# Patient Record
Sex: FEMALE | Race: BLACK OR AFRICAN AMERICAN | Employment: UNEMPLOYED | URBAN - METROPOLITAN AREA
[De-identification: names, ages, dates, MRNs, and addresses within clinical notes are randomized per-mention and may not be internally consistent; named-entity substitution may affect disease eponyms.]

---

## 2019-12-19 ENCOUNTER — HOSPITAL ENCOUNTER (OUTPATIENT)
Dept: LAB | Age: 44
Discharge: HOME | End: 2019-12-19

## 2019-12-19 ENCOUNTER — OFFICE VISIT (OUTPATIENT)
Dept: FAMILY MEDICINE CLINIC | Age: 44
End: 2019-12-19

## 2019-12-19 VITALS
HEIGHT: 62 IN | DIASTOLIC BLOOD PRESSURE: 67 MMHG | OXYGEN SATURATION: 95 % | TEMPERATURE: 98.4 F | WEIGHT: 186.4 LBS | HEART RATE: 78 BPM | SYSTOLIC BLOOD PRESSURE: 105 MMHG | BODY MASS INDEX: 34.3 KG/M2 | RESPIRATION RATE: 16 BRPM

## 2019-12-19 DIAGNOSIS — Z80.0 FAMILY HISTORY OF COLON CANCER IN FATHER: ICD-10-CM

## 2019-12-19 DIAGNOSIS — Z00.00 HEALTHCARE MAINTENANCE: Primary | ICD-10-CM

## 2019-12-19 DIAGNOSIS — Z00.00 HEALTHCARE MAINTENANCE: ICD-10-CM

## 2019-12-19 LAB
ALBUMIN SERPL-MCNC: 4.2 G/DL (ref 3.5–5)
ALBUMIN/GLOB SERPL: 1 {RATIO} (ref 1.1–2.2)
ALP SERPL-CCNC: 82 U/L (ref 45–117)
ALT SERPL-CCNC: 26 U/L (ref 12–78)
ANION GAP SERPL CALC-SCNC: 8 MMOL/L (ref 5–15)
AST SERPL-CCNC: 18 U/L (ref 15–37)
BASOPHILS # BLD: 0.1 K/UL (ref 0–0.1)
BASOPHILS NFR BLD: 1 % (ref 0–1)
BILIRUB SERPL-MCNC: 0.5 MG/DL (ref 0.2–1)
BUN SERPL-MCNC: 20 MG/DL (ref 6–20)
BUN/CREAT SERPL: 23 (ref 12–20)
CALCIUM SERPL-MCNC: 9.2 MG/DL (ref 8.5–10.1)
CHLORIDE SERPL-SCNC: 101 MMOL/L (ref 97–108)
CHOLEST SERPL-MCNC: 272 MG/DL
CO2 SERPL-SCNC: 26 MMOL/L (ref 21–32)
CREAT SERPL-MCNC: 0.88 MG/DL (ref 0.55–1.02)
DIFFERENTIAL METHOD BLD: NORMAL
EOSINOPHIL # BLD: 0.2 K/UL (ref 0–0.4)
EOSINOPHIL NFR BLD: 2 % (ref 0–7)
ERYTHROCYTE [DISTWIDTH] IN BLOOD BY AUTOMATED COUNT: 13.5 % (ref 11.5–14.5)
GLOBULIN SER CALC-MCNC: 4.1 G/DL (ref 2–4)
GLUCOSE SERPL-MCNC: 93 MG/DL (ref 65–100)
HCT VFR BLD AUTO: 39.7 % (ref 35–47)
HDLC SERPL-MCNC: 64 MG/DL
HDLC SERPL: 4.3 {RATIO} (ref 0–5)
HGB BLD-MCNC: 12.3 G/DL (ref 11.5–16)
IMM GRANULOCYTES # BLD AUTO: 0 K/UL (ref 0–0.04)
IMM GRANULOCYTES NFR BLD AUTO: 0 % (ref 0–0.5)
LDLC SERPL CALC-MCNC: 192.4 MG/DL (ref 0–100)
LIPID PROFILE,FLP: ABNORMAL
LYMPHOCYTES # BLD: 2.2 K/UL (ref 0.8–3.5)
LYMPHOCYTES NFR BLD: 27 % (ref 12–49)
MCH RBC QN AUTO: 26.5 PG (ref 26–34)
MCHC RBC AUTO-ENTMCNC: 31 G/DL (ref 30–36.5)
MCV RBC AUTO: 85.4 FL (ref 80–99)
MONOCYTES # BLD: 0.6 K/UL (ref 0–1)
MONOCYTES NFR BLD: 7 % (ref 5–13)
NEUTS SEG # BLD: 5.1 K/UL (ref 1.8–8)
NEUTS SEG NFR BLD: 63 % (ref 32–75)
NRBC # BLD: 0 K/UL (ref 0–0.01)
NRBC BLD-RTO: 0 PER 100 WBC
PLATELET # BLD AUTO: 266 K/UL (ref 150–400)
PMV BLD AUTO: 10.2 FL (ref 8.9–12.9)
POTASSIUM SERPL-SCNC: 3.9 MMOL/L (ref 3.5–5.1)
PROT SERPL-MCNC: 8.3 G/DL (ref 6.4–8.2)
RBC # BLD AUTO: 4.65 M/UL (ref 3.8–5.2)
SODIUM SERPL-SCNC: 135 MMOL/L (ref 136–145)
TRIGL SERPL-MCNC: 78 MG/DL (ref ?–150)
TSH SERPL DL<=0.05 MIU/L-ACNC: 1.38 UIU/ML (ref 0.36–3.74)
VLDLC SERPL CALC-MCNC: 15.6 MG/DL
WBC # BLD AUTO: 8.1 K/UL (ref 3.6–11)

## 2019-12-19 RX ORDER — BISMUTH SUBSALICYLATE 262 MG
1 TABLET,CHEWABLE ORAL DAILY
COMMUNITY
End: 2021-10-01

## 2019-12-19 NOTE — PROGRESS NOTES
Chief Complaint   Patient presents with   [address] [place] [address]     1. Have you been to the ER, urgent care clinic since your last visit? Hospitalized since your last visit? no    2. Have you seen or consulted any other health care providers outside of the [address] [place] [address] since your last visit? Include any pap smears or colon screening.  no      Tdap--had in 2012  flu--October for flu--    Pap smear--last pap 2015    Abdominal discomfort---going on for about 2 years

## 2019-12-19 NOTE — PROGRESS NOTES
Liam Salazar is an 38 y.o. female who presents to [facility] care   Patient was previously receiving care at: [facility] with Dr Johan Fernandes history significant for: HLD, PCOS, prediabetes, anemia, hand OA. Patient would like to address abdominal pain at this time. First noticed pain during her physical three years ago while doctor was palpating abdomen. States abdominal pain is only present on palpation, diffuse, 1-2/10 intensity. Currently not present. Denies any diarrhea, constipation, hematochezia, melena, N/V, unintentional weight loss. Prior PCP collected an FOBT which was negative. Has never had colonoscopy. [facility] Care  PAP all normal   Mammogram negative  LMP: 19  Regular menstrual periods  Diet exercising     PAP 3 years ago \"normal\"    Review of Systems   Review of Systems   Constitutional: Negative for chills and fever. HENT: Negative for congestion. Eyes: Negative for blurred vision and double vision. Respiratory: Negative for cough and hemoptysis. Cardiovascular: Negative for chest pain and palpitations. Gastrointestinal: Negative for abdominal pain, blood in stool, constipation, diarrhea, heartburn, melena, nausea and vomiting. Genitourinary: Negative for dysuria and urgency. Musculoskeletal: Negative for back pain and joint pain. Neurological: Negative for dizziness, focal weakness and headaches. Psychiatric/Behavioral: Negative for suicidal ideas. Current Medications  Current medications include:   Current Outpatient Medications   Medication Sig    Cetirizine (ZYRTEC) 10 mg cap Take  by mouth.  multivitamin (ONE A DAY) tablet Take 1 Tab by mouth daily. No current facility-administered medications for this visit. Allergies  No Known Allergies    Past Medical History  History reviewed. No pertinent past medical history.     Past Surgical History   Past Surgical History:   Procedure Laterality Date    HX  SECTION         Family History  Family History   Problem Relation Age of Onset    Hypertension Mother     Elevated Lipids Mother     Diabetes Mother     Colon Cancer Father     Heart Disease Father     Hypertension Father     Elevated Lipids Father        Social History  Social History     Socioeconomic History    Marital status:      Spouse name: Not on file    Number of children: Not on file    Years of education: Not on file    Highest education level: Not on file   Occupational History    Not on file   Social Needs    Financial resource strain: Not on file    Food insecurity:     Worry: Not on file     Inability: Not on file    Transportation needs:     Medical: Not on file     Non-medical: Not on file   Tobacco Use    Smoking status: Never Smoker    Smokeless tobacco: Never Used   Substance and Sexual Activity    Alcohol use:  Yes    Drug use: Never    Sexual activity: Yes   Lifestyle    Physical activity:     Days per week: Not on file     Minutes per session: Not on file    Stress: Not on file   Relationships    Social connections:     Talks on phone: Not on file     Gets together: Not on file     Attends Evangelical service: Not on file     Active member of club or organization: Not on file     Attends meetings of clubs or organizations: Not on file     Relationship status: Not on file    Intimate partner violence:     Fear of current or ex partner: Not on file     Emotionally abused: Not on file     Physically abused: Not on file     Forced sexual activity: Not on file   Other Topics Concern    Not on file   Social History Narrative    Not on file       Immunizations  Immunization History   Administered Date(s) Administered    Influenza Vaccine 10/15/2019    Tdap 06/15/2012       Health Maintenance  Colonoscopy never performed, FOBT negative    Objective   Vital Signs  Visit Vitals  /67 (BP 1 Location: Right arm, BP Patient Position: Sitting)   Pulse 78   Temp 98.4 °F (36.9 °C) (Oral)   Resp 16   Ht 5' 2\" (1.575 m)   Wt 186 lb 6.4 oz (84.6 kg)   LMP 12/03/2019 (Approximate)   SpO2 95%   BMI 34.09 kg/m²       Physical Exam  Physical Exam  Constitutional:       Appearance: Normal appearance. HENT:      Head: Normocephalic and atraumatic. Neck:      Musculoskeletal: Normal range of motion and neck supple. Cardiovascular:      Rate and Rhythm: Normal rate and regular rhythm. Heart sounds: No murmur. Pulmonary:      Effort: Pulmonary effort is normal. No respiratory distress. Breath sounds: Normal breath sounds. No wheezing, rhonchi or rales. Abdominal:      General: Abdomen is flat. Bowel sounds are normal. There is no distension. Palpations: Abdomen is soft. There is no mass. Tenderness: There is tenderness. There is no guarding or rebound. Comments: Diffusely tender to palpation     Musculoskeletal: Normal range of motion. General: No swelling. Skin:     General: Skin is warm and dry. Capillary Refill: Capillary refill takes less than 2 seconds. Neurological:      General: No focal deficit present. Mental Status: She is alert and oriented to person, place, and time. Mental status is at baseline. Psychiatric:         Mood and Affect: Mood normal.           Assessment:   Paco Clark is a 38 y.o. here to establish to care. Plan:   1. Abdominal pain: Nonspecific, only present on palpation. FOBT negative. - Given father passed away from colon cancer will refer patient to GI.     2. Health Maintenance  - CBC, CMP, lipid panel, A1c, TSH, and Vit D  - Referral to GI for colon cancer screening given father passed away from colon cancer  - Due for mammogram  - Will sign consent to obtain medical records from previous PCP. · Counseled re: diet, exercise, healthy lifestyle  · Appropriate labs, vaccines, imaging studies, and referrals ordered as listed above  · Discussed the patient's BMI with her.     · The patient was counseled on the dangers of tobacco use, and was advised to quit. Reviewed strategies to maximize success, including written materials. I discussed the aforementioned diagnoses with the patient as well as the plan of care. All questions were answered and an AVS was provided.      I discussed this patient with Dr. Andrew Mehta (Attending Physician)      Signed By:  Kenneth Barros MD    Family Medicine Resident, PGY1

## 2019-12-20 LAB
25(OH)D3 SERPL-MCNC: 40.2 NG/ML (ref 30–100)
EST. AVERAGE GLUCOSE BLD GHB EST-MCNC: 114 MG/DL
HBA1C MFR BLD: 5.6 % (ref 4–5.6)

## 2019-12-21 NOTE — PROGRESS NOTES
A1c 5.4  TSH wnl  Lipid panel t chol 272, TL 78, .4 current ASCVD risk 0.3% would not benefit from statin therapy at this time.    CMP with Na mildly low 135  CBC wnl  Vit d wnl

## 2019-12-27 ENCOUNTER — TELEPHONE (OUTPATIENT)
Dept: FAMILY MEDICINE CLINIC | Age: 44
End: 2019-12-27

## 2019-12-27 NOTE — TELEPHONE ENCOUNTER
Per patient,    appt is 2/3/20 with below office, states she will see the NP, she doesn't know name    Rolan Villeda None Evaristo Rivera MD 1     Office ph [phone]

## 2020-01-10 ENCOUNTER — HOSPITAL ENCOUNTER (OUTPATIENT)
Dept: MAMMOGRAPHY | Age: 45
Discharge: HOME | End: 2020-01-10
Attending: STUDENT IN AN ORGANIZED HEALTH CARE EDUCATION/TRAINING PROGRAM
Payer: OTHER GOVERNMENT

## 2020-01-10 DIAGNOSIS — Z00.00 HEALTHCARE MAINTENANCE: ICD-10-CM

## 2020-01-10 PROCEDURE — 77067 SCR MAMMO BI INCL CAD: CPT

## 2020-11-23 ENCOUNTER — VIRTUAL VISIT (OUTPATIENT)
Dept: FAMILY MEDICINE CLINIC | Age: 45
End: 2020-11-23
Payer: OTHER GOVERNMENT

## 2020-11-23 DIAGNOSIS — R20.0 NUMBNESS OF LEFT HAND: ICD-10-CM

## 2020-11-23 DIAGNOSIS — R51.9 NONINTRACTABLE EPISODIC HEADACHE, UNSPECIFIED HEADACHE TYPE: Primary | ICD-10-CM

## 2020-11-23 DIAGNOSIS — H53.8 BLURRY VISION, RIGHT EYE: ICD-10-CM

## 2020-11-23 PROCEDURE — 99443 PR PHYS/QHP TELEPHONE EVALUATION 21-30 MIN: CPT | Performed by: STUDENT IN AN ORGANIZED HEALTH CARE EDUCATION/TRAINING PROGRAM

## 2020-11-23 NOTE — Clinical Note
Please place [facility] for sports med clinic appt for L hand numbness in the next 3-4 weeks. Thanks!

## 2020-11-23 NOTE — PROGRESS NOTES
Cephus Seip  41 y.o. female  1975  [facility] [address] [id number]  [id number]    [phone] (home)      [address]:    Telephone Encounter  Hailey Stuart, [place]       Encounter Date: 11/23/2020 at 4:22 PM    Consent: Cephus Seip, who was seen by synchronous (real-time) audio only technology, and/or her healthcare decision maker, is aware that this patient-initiated, Telehealth encounter on 11/23/2020 is a billable service, with coverage as determined by her insurance carrier. She is aware that she may receive a bill and has provided verbal consent to proceed: Yes. Chief Complaint   Patient presents with    Headache       History of Present Illness   Cephus Seip is a 41 y.o. female was evaluated by telephone. I communicated with the patient and/or health care decision maker about her HA's and weight loss. Headaches  - Present for about 10 weeks  - Described as pounding, severe HA for a few minutes. Located on whole head, but worse on R>L side. Occurs 1-2 times per week. Not increasing in frequency. HA's come about most noticeable when she rolls over in bed. Cannot recreate the HA's on her own. No TTP of forehead/temporal artery. Denies lacrimation, rhinorrhea or nasal congestion.  - Denies N, V, photophobia, phonophobia   - No hx of any blood pressure issues. 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 At recent health fair at the end of August with her job, L eye was 20/20 and R eye was 50/20    L hand numbness  - Also reporting an \"itch\" in her hand that has now transitioned to a numbness, tingling sensation in her palmar aspect of her L hand. Reports some neck pain on her L side as well as b/l wrist pain.        Of note, she denies any 1st degree family relatives with autoimmune conditions or MS    Review of Systems   Review of Systems   Constitutional: Negative for chills, fever, malaise/fatigue and weight loss. HENT: Negative for sore throat. Respiratory: Negative for cough and shortness of breath. Cardiovascular: Negative for chest pain and palpitations. Neurological: Positive for headaches. Negative for dizziness and weakness. Vitals/Objective:   General: Patient speaking in complete sentences without effort. Normal speech and cooperative. Patient-Reported Vitals 11/23/2020   Patient-Reported Weight 190   Patient-Reported Height 5'2\"   Patient-Reported Pulse 90   Patient-Reported Temperature 98.1   Patient-Reported LMP 11/3/20        Due to this being a Virtual Check-in/Telephone evaluation, many elements of the physical examination are unable to be assessed. Assessment and Plan:   Time-based coding, delete if not needed: I spent at least 25 minutes with this established patient, and >50% of the time was spent counseling and/or coordinating care regarding her headaches, vision changes, hand numbness  Total Time: minutes: 21-30 minutes    1. Nonintractable episodic headache, unspecified headache type: Potentially cluster HA, though appears to be an atypical presentation. Will hold off on imaging at this time and refer patient to neurology  - REFERRAL TO NEUROLOGY    2. Blurry vision, right eye: Vision noted to be diminished in R eye recently compared to L. Will refer for formal evaluation.   - REFERRAL TO OPHTHALMOLOGY    3. Numbness of left hand: Potentially related to neck or wrist pain. Will need evaluation in sports med clinic to further evaluate and may consider imaging if needed. - Sent message to schedule sports med appointment in clinic       We discussed the expected course, resolution and complications of the diagnosis(es) in detail. Medication risks, benefits, costs, interactions, and alternatives were discussed as indicated.   I advised her to contact the office if her condition worsens, changes or fails to improve as anticipated. She expressed understanding with the diagnosis(es) and plan. Patient understands that this encounter was a temporary measure, and the importance of further follow up and examination was emphasized. Patient verbalized understanding. Patient informed to follow up: 4 weeks    I affirm this is a Patient Initiated Episode with an Established Patient who has not had a related appointment within my department in the past 7 days or scheduled within the next 24 hours. Note: not billable if this call serves to triage the patient into an appointment for the relevant concern      Electronically Signed: Jennifer Lao DO  Providers location when delivering service: clinic    CPT:  04875 (5-10 minutes)  [phone] (11-20 minutes)  21  (21-30 minutes)    Medicare:  [address]      ICD-10-CM ICD-9-CM    1. Nonintractable episodic headache, unspecified headache type  R51.9 784.0 REFERRAL TO NEUROLOGY   2. Blurry vision, right eye  H53.8 368.8 REFERRAL TO OPHTHALMOLOGY   3. Numbness of left hand  R20.0 782.0        Pursuant to the emergency declaration under the [facility] waiver authority and the [employer] Act, this Virtual  Visit was conducted, with patient's consent, to reduce the patient's risk of exposure to COVID-19 and provide continuity of care for an established patient. History   Patients past medical, surgical and family histories were personally reviewed and updated.   Yes    Past Medical History:   Diagnosis Date    Anemia     HLD (hyperlipidemia)     PCOS (polycystic ovarian syndrome)     Prediabetes      Past Surgical History:   Procedure Laterality Date    HX  SECTION       Family History   Problem Relation Age of Onset    Hypertension Mother    Martina Finney Elevated Lipids Mother     Diabetes Mother    Martina Finney Arthritis-osteo Mother     Cancer Mother     Lung Disease Mother     Colon Cancer Father     Heart Disease Father     Hypertension Father     Elevated Lipids Father     Cancer Father     Lung Disease Father     Breast Cancer Paternal Aunt 79     Social History     Socioeconomic History    Marital status:      Spouse name: Not on file    Number of children: Not on file    Years of education: Not on file    Highest education level: Not on file   Occupational History    Not on file   Social Needs    Financial resource strain: Not on file    Food insecurity     Worry: Not on file     Inability: Not on file    Transportation needs     Medical: Not on file     Non-medical: Not on file   Tobacco Use    Smoking status: Never Smoker    Smokeless tobacco: Never Used   Substance and Sexual Activity    Alcohol use: Yes     Alcohol/week: 2.0 standard drinks     Types: 2 Standard drinks or equivalent per week    Drug use: Never    Sexual activity: Yes     Partners: Male     Birth control/protection: Surgical     Comment:  surgical, 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   Lifestyle    Physical activity     Days per week: Not on file     Minutes per session: Not on file    Stress: Not on file   Relationships    Social connections     Talks on phone: Not on file     Gets together: Not on file     Attends Evangelical service: Not on file     Active member of club or organization: Not on file     Attends meetings of clubs or organizations: Not on file     Relationship status: Not on file    Intimate partner violence     Fear of current or ex partner: Not on file     Emotionally abused: Not on file     Physically abused: Not on file     Forced sexual activity: Not on file   Other Topics Concern    Not on file   Social History Narrative    Not on file            Current Medications/Allergies   Medications and Allergies reviewed:    Current Outpatient Medications   Medication Sig Dispense Refill    Cetirizine (ZYRTEC) 10 mg cap Take  by mouth.      multivitamin (ONE A DAY) tablet Take 1 Tab by mouth daily.        No Known Allergies

## 2020-11-23 NOTE — PROGRESS NOTES
[address] Medicine Residency Attending Addendum:  Dr. Deloris Braun, DO,  the patient and I were not physically present during this encounter. The resident and I are concurrently monitoring the patient care through appropriate telecommunication technology. I discussed the findings, assessment and plan with the resident and agree with the resident's findings and plan as documented in the resident's note.       Ashley Elliott MD      Patient-Reported Vitals 11/23/2020   Patient-Reported Weight 190   Patient-Reported Height 5'2\"   Patient-Reported Pulse 90   Patient-Reported Temperature 98.1   Patient-Reported LMP 11/3/20

## 2020-11-30 ENCOUNTER — TELEPHONE (OUTPATIENT)
Dept: FAMILY MEDICINE CLINIC | Age: 45
End: 2020-11-30

## 2020-11-30 NOTE — TELEPHONE ENCOUNTER
Called, no answer.  Left message to call office  ===View-only below this line===  ----- Message -----  From: Liset Chilel DO  Sent: 11/23/2020   6:29 PM EST  To: [facility] Front Office    Please place tickler for sports med clinic appt for L hand numbness in the next 3-4 weeks

## 2020-12-18 ENCOUNTER — VIRTUAL VISIT (OUTPATIENT)
Dept: FAMILY MEDICINE CLINIC | Age: 45
End: 2020-12-18
Payer: OTHER GOVERNMENT

## 2020-12-18 DIAGNOSIS — E66.9 OBESITY (BMI 30.0-34.9): ICD-10-CM

## 2020-12-18 DIAGNOSIS — E78.2 MIXED HYPERLIPIDEMIA: ICD-10-CM

## 2020-12-18 DIAGNOSIS — Z00.00 WELL WOMAN EXAM (NO GYNECOLOGICAL EXAM): Primary | ICD-10-CM

## 2020-12-18 PROCEDURE — 99396 PREV VISIT EST AGE 40-64: CPT | Performed by: STUDENT IN AN ORGANIZED HEALTH CARE EDUCATION/TRAINING PROGRAM

## 2020-12-18 NOTE — PROGRESS NOTES
History of Present Illness:     Consent:  Patient and/or health care decision maker is aware that that she may receive a bill for this telephone service, depending on her insurance coverage, and has provided verbal consent to proceed: Yes    Patient was evaluated by synchronous (real-time) audio-video technology from home, through the [employer] portal      Chief Complaint   Patient presents with    Medication Evaluation       Bianca Louie is a 41 y.o. female with no significant PMH who is being seen today for a complete physical:     Medical Hx and Medications: Seasonal Allergies & PCOS, takes Zyrtec 10 mg   Surg Hx: CS X2  FH: Father with colon cancer ( at 79 yrs of age) & mom & 2 aunts with breast cancer. Allergies: None     Preventative measures:   Colonoscopy: NA  TDAP: UTD  Mammogram: UTD  PAP: Due    Social measures:  Smoking: None  Alcohol: Ocasionally (1-2 drinks 3X a week)  Exercise: Was doing well with exercise but then took a travel covid assignment and has not done much since. Was going to [employer] and doing online videos as well  Diet: Lately has been eating 1-2 meals a day but when she does well she does several small meals a day and avoids simple carbs. PMH (REVIEWED):  Past Medical History:   Diagnosis Date    Anemia     HLD (hyperlipidemia)     PCOS (polycystic ovarian syndrome)     Prediabetes        Current Medications/Allergies (REVIEWED):     Current Outpatient Medications on File Prior to Visit   Medication Sig Dispense Refill    Cetirizine (ZYRTEC) 10 mg cap Take  by mouth.  multivitamin (ONE A DAY) tablet Take 1 Tab by mouth daily. No current facility-administered medications on file prior to visit. No Known Allergies    Review of Systems:     Review of Systems   Constitutional: Negative for chills and fever. HENT: Negative for congestion and sore throat. Respiratory: Negative for cough and shortness of breath.     Cardiovascular: Negative for chest pain and palpitations. Gastrointestinal: Negative for diarrhea, nausea and vomiting. Psychiatric/Behavioral: Negative for depression and suicidal ideas. Objective:     General: alert, cooperative, no distress   Mental  status: mental status: alert, oriented to person, place, and time, normal mood, behavior, speech, dress, motor activity, and thought processes   Resp: resp: normal effort and no respiratory distress   Neuro: neuro: no gross deficits   Skin: skin: no discoloration or lesions of concern on visible areas   Due to this being a TeleHealth evaluation, many elements of the physical examination are unable to be assessed. Assessment and Plan:     Well woman exam (no gynecological exam)  - Discussed PAP, pt would like to wait till spring   - METABOLIC PANEL, BASIC; Future    Mixed hyperlipidemia  - LIPID PANEL; Future    Obesity (BMI 30.0-34.9)  - We discussed lifestyle and dietary changes- intermittent fasting vs keto   - Discussed Dr. Kimani Myers weight loss clinic and sent my chart message with info    Follow up: for PAP    Jade Shin DO    Time spent in direct conversation with the patient to include medical condition(s) discussed, assessment and treatment plan:    We discussed the expected course, resolution and complications of the diagnosis(es) in detail. Medication risks, benefits, costs, interactions, and alternatives were discussed as indicated. I advised her to contact the office if her condition worsens, changes or fails to improve as anticipated. She expressed understanding with the diagnosis(es) and plan. Patient understands that this encounter was a temporary measure, and the importance of further follow up and examination was emphasized. Patient verbalized understanding.        Electronically Signed: Jade Shin DO    Providers location when delivering service (clinic, hospital, home): Home     CPT Codes 30762-56853 for Established Patients may apply to this Telehealth Visit. POS code: 18.   Modifier GT

## 2020-12-20 NOTE — PROGRESS NOTES
[address] Medicine Residency Attending Addendum:  Dr. Travis Ibarra, DO,  the patient and I were not physically present during this encounter. The resident and I are concurrently monitoring the patient care through appropriate telecommunication technology. I discussed the findings, assessment and plan with the resident and agree with the resident's findings and plan as documented in the resident's note.       Beryle Marrow, MD

## 2021-01-26 ENCOUNTER — TELEPHONE (OUTPATIENT)
Dept: FAMILY MEDICINE CLINIC | Age: 46
End: 2021-01-26

## 2021-01-26 NOTE — TELEPHONE ENCOUNTER
Tried calling Camilla Shameka Melina regarding concerns and left VM. Will send message to front office to schedule appointment.

## 2021-01-26 NOTE — TELEPHONE ENCOUNTER
----- Message from Lynne Chavez sent at 1/25/2021 11:07 AM EST -----  Regarding: FW: Non-Urgent Medical Question  Contact: [phone]  This is 2/2 messages. Thank you  ----- Message -----  From: Bacilio Paz  Sent: 1/16/2021   9:58 AM EST  To: [facility] Nurse  Subject: RE: Non-Urgent Medical Question                  Dr. Kenneth Swift, I unfortunately just found a lump on my left breast/axilla area and will need a diagnostic mammogram. I have felt other weird things before but this has me unsettled by the way it feels and moves. Maybe its because my mom is battling breast cancer right now I dont know, but a I believe I need a diagnostic mammogram this time around.   Thank you

## 2021-01-27 ENCOUNTER — TELEPHONE (OUTPATIENT)
Dept: FAMILY MEDICINE CLINIC | Age: 46
End: 2021-01-27

## 2021-01-27 NOTE — TELEPHONE ENCOUNTER
----- Message from Yaquelin Hicks MD sent at 1/26/2021  5:30 PM EST -----  Regarding: appointment  Please schedule VV as soon as possible please. Thank  you!

## 2021-02-09 ENCOUNTER — TELEPHONE (OUTPATIENT)
Dept: FAMILY MEDICINE CLINIC | Age: 46
End: 2021-02-09

## 2021-02-09 DIAGNOSIS — Z12.31 BREAST CANCER SCREENING BY MAMMOGRAM: Primary | ICD-10-CM

## 2021-02-23 ENCOUNTER — TELEPHONE (OUTPATIENT)
Dept: FAMILY MEDICINE CLINIC | Age: 46
End: 2021-02-23

## 2021-02-23 ENCOUNTER — HOSPITAL ENCOUNTER (OUTPATIENT)
Dept: MAMMOGRAPHY | Age: 46
Discharge: HOME | End: 2021-02-23
Attending: STUDENT IN AN ORGANIZED HEALTH CARE EDUCATION/TRAINING PROGRAM

## 2021-02-23 DIAGNOSIS — Z12.31 BREAST CANCER SCREENING BY MAMMOGRAM: ICD-10-CM

## 2021-02-25 ENCOUNTER — TELEPHONE (OUTPATIENT)
Dept: FAMILY MEDICINE CLINIC | Age: 46
End: 2021-02-25

## 2021-02-25 DIAGNOSIS — Z12.31 BREAST CANCER SCREENING BY MAMMOGRAM: ICD-10-CM

## 2021-02-25 DIAGNOSIS — N63.0 BREAST LUMP: Primary | ICD-10-CM

## 2021-02-25 DIAGNOSIS — R92.2 DENSE BREAST TISSUE ON MAMMOGRAM: ICD-10-CM

## 2021-02-25 NOTE — TELEPHONE ENCOUNTER
Patient states her mother was recently diagnosed with breast cancer and she feels a lump, requesting mammogram order.  Will order diagnostic mammogram.

## 2021-02-26 ENCOUNTER — OFFICE VISIT (OUTPATIENT)
Dept: NEUROLOGY | Age: 46
End: 2021-02-26
Payer: OTHER GOVERNMENT

## 2021-02-26 VITALS
OXYGEN SATURATION: 98 % | SYSTOLIC BLOOD PRESSURE: 124 MMHG | DIASTOLIC BLOOD PRESSURE: 78 MMHG | HEART RATE: 89 BPM | BODY MASS INDEX: 38.23 KG/M2 | TEMPERATURE: 97.5 F | WEIGHT: 209 LBS

## 2021-02-26 DIAGNOSIS — R20.2 PARESTHESIAS IN LEFT HAND: ICD-10-CM

## 2021-02-26 DIAGNOSIS — R51.9 POUNDING HEADACHE: ICD-10-CM

## 2021-02-26 DIAGNOSIS — H53.9 VISUAL CHANGES: Primary | ICD-10-CM

## 2021-02-26 PROCEDURE — 99204 OFFICE O/P NEW MOD 45 MIN: CPT | Performed by: PSYCHIATRY & NEUROLOGY

## 2021-03-03 ENCOUNTER — HOSPITAL ENCOUNTER (OUTPATIENT)
Dept: MRI IMAGING | Age: 46
Discharge: HOME | End: 2021-03-03
Attending: PSYCHIATRY & NEUROLOGY
Payer: OTHER GOVERNMENT

## 2021-03-03 DIAGNOSIS — H53.9 VISUAL CHANGES: ICD-10-CM

## 2021-03-03 DIAGNOSIS — R51.9 POUNDING HEADACHE: ICD-10-CM

## 2021-03-03 DIAGNOSIS — R20.2 PARESTHESIAS IN LEFT HAND: ICD-10-CM

## 2021-03-03 PROCEDURE — 74011250636 HC RX REV CODE- 250/636: Performed by: PSYCHIATRY & NEUROLOGY

## 2021-03-03 PROCEDURE — 70553 MRI BRAIN STEM W/O & W/DYE: CPT

## 2021-03-03 PROCEDURE — A9575 INJ GADOTERATE MEGLUMI 0.1ML: HCPCS | Performed by: PSYCHIATRY & NEUROLOGY

## 2021-03-03 RX ORDER — GADOTERATE MEGLUMINE 376.9 MG/ML
18 INJECTION INTRAVENOUS
Status: COMPLETED | OUTPATIENT
Start: 2021-03-03 | End: 2021-03-03

## 2021-03-03 RX ADMIN — GADOTERATE MEGLUMINE 18 ML: 376.9 INJECTION INTRAVENOUS at 09:00

## 2021-03-09 ENCOUNTER — HOSPITAL ENCOUNTER (OUTPATIENT)
Dept: MAMMOGRAPHY | Age: 46
Discharge: HOME | End: 2021-03-09
Attending: STUDENT IN AN ORGANIZED HEALTH CARE EDUCATION/TRAINING PROGRAM
Payer: OTHER GOVERNMENT

## 2021-03-09 DIAGNOSIS — N63.0 LUMP OR MASS IN BREAST: ICD-10-CM

## 2021-03-09 DIAGNOSIS — Z12.31 BREAST CANCER SCREENING BY MAMMOGRAM: ICD-10-CM

## 2021-03-09 PROCEDURE — 76642 ULTRASOUND BREAST LIMITED: CPT

## 2021-03-09 PROCEDURE — 77062 BREAST TOMOSYNTHESIS BI: CPT

## 2021-04-09 ENCOUNTER — TELEPHONE (OUTPATIENT)
Dept: FAMILY MEDICINE CLINIC | Age: 46
End: 2021-04-09

## 2021-10-01 ENCOUNTER — OFFICE VISIT (OUTPATIENT)
Dept: FAMILY MEDICINE CLINIC | Age: 46
End: 2021-10-01
Payer: OTHER GOVERNMENT

## 2021-10-01 VITALS
SYSTOLIC BLOOD PRESSURE: 115 MMHG | TEMPERATURE: 99 F | OXYGEN SATURATION: 97 % | HEIGHT: 62 IN | HEART RATE: 87 BPM | DIASTOLIC BLOOD PRESSURE: 75 MMHG | BODY MASS INDEX: 38.23 KG/M2 | RESPIRATION RATE: 16 BRPM

## 2021-10-01 DIAGNOSIS — M79.662 PAIN AND SWELLING OF LEFT LOWER LEG: ICD-10-CM

## 2021-10-01 DIAGNOSIS — R06.83 SNORING: ICD-10-CM

## 2021-10-01 DIAGNOSIS — R03.0 ELEVATED BLOOD PRESSURE READING WITHOUT DIAGNOSIS OF HYPERTENSION: Primary | ICD-10-CM

## 2021-10-01 DIAGNOSIS — M79.89 PAIN AND SWELLING OF LEFT LOWER LEG: ICD-10-CM

## 2021-10-01 DIAGNOSIS — E66.9 OBESITY (BMI 30-39.9): ICD-10-CM

## 2021-10-01 DIAGNOSIS — G47.33 OBSTRUCTIVE SLEEP APNEA SYNDROME: ICD-10-CM

## 2021-10-01 PROCEDURE — 99213 OFFICE O/P EST LOW 20 MIN: CPT | Performed by: STUDENT IN AN ORGANIZED HEALTH CARE EDUCATION/TRAINING PROGRAM

## 2021-10-01 NOTE — PROGRESS NOTES
Subjective   CC:  Nichole Bhatia is a 40 y.o. female who presents for follow up after ED visit. Left leg pain/swelling:   - Started on 9/26. First noticed pain, swelling, and firmness in her left medial thigh that travelled to left medial calf. - Seen at [facility] ED on 9/29 where she had a negative D-dimer (no LLE US) and was discharged home  - Pain/swelling has improved  - No erythema, warmth, or fever   - No obvious injury or fall prior to onset     Elevated BP:  - While in the ED BP was elevated to 980 systolic, eventually improved to 737Q systolic by the time she was discharged   - No hx of elevated BP  - No headache, dizziness, CP, SOB    Sleep apnea:  - Diagnosed with KEYONNA in 2014 while living in [place], [place]  - Was prescribed a mouth guard to wear while sleeping but never got a chance to have it made   - Has gained ~20-25 lbs since then   - Snores, has apneic episodes   - No fatigue/morning headaches   - Had somnoplasty over the summer, last treatment August 2021   - Would like to be evaluated again by sleep medicine     Joint/muscle pain:   - Diagnosed with fibromyalgia several years ago by her OB/GYN  - Has diffuse joint and muscle pain that is worse with palpation   - No improvement with exercise, the only thing that made it better was cutting out gluten from her diet for a period of time   - Had been exercising regularly at [address] [place] [address] [email or web address] ([employer] style workouts) up until August when a new semester started     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 HENT: Negative for congestion, rhinorrhea and sore throat. Respiratory: Negative for cough, shortness of breath and wheezing. Cardiovascular: Positive for leg swelling. Negative for chest pain and palpitations. Gastrointestinal: Negative for abdominal pain, diarrhea, nausea and vomiting. Musculoskeletal: Positive for arthralgias and myalgias. Skin: Negative for color change, rash and wound. Neurological: Negative for dizziness, weakness, numbness and headaches. Past Medical History  Past Medical History:   Diagnosis Date    Anemia     Fibromyalgia     HLD (hyperlipidemia)     Obesity (BMI 30-39. 9)     KEYONNA (obstructive sleep apnea)     PCOS (polycystic ovarian syndrome)     Prediabetes        Current Medications  Current Outpatient Medications   Medication Sig Dispense Refill    carboxymethylcellulose/citric (PLENITY PO) Take  by mouth.  Cetirizine (ZYRTEC) 10 mg cap Take  by mouth. Allergies  No Known Allergies    Social History   Social History     Socioeconomic History    Marital status:      Spouse name: Not on file    Number of children: Not on file    Years of education: Not on file    Highest education level: Not on file   Occupational History     Comment: Nurse at [address]    Smoking status: Never Smoker    Smokeless tobacco: Never Used   Substance and Sexual Activity    Alcohol use: Yes     Alcohol/week: 2.0 standard drinks     Types: 2 Standard drinks or equivalent per week    Drug use: Never    Sexual activity: Yes     Partners: Male     Birth control/protection: Surgical     Comment:  surgical, not me. Still open for business   Other Topics Concern    Not on file   Social History Narrative    Not on file     Social Determinants of Health     Financial Resource Strain:     Difficulty of Paying Living Expenses:    Food Insecurity:     Worried About Running Out of Food in the Last Year:     [address] in the Last Year:    Transportation Needs:     Lack of Transportation (Medical):      Lack of Transportation (Non-Medical):    Physical Activity:     Days of Exercise per Week:     Minutes of Exercise per Session:    Stress:     Feeling of Stress :    Social Connections:     Frequency of Communication with Friends and Family:     Frequency of Social Gatherings with Friends and Family:     Attends [employer] Services:     Active Member of Clubs or Organizations:     Attends Club or Organization Meetings:     Marital Status:    Intimate Partner Violence:     Fear of Current or Ex-Partner:     Emotionally Abused:     Physically Abused:     Sexually Abused:        Family History  Family History   Problem Relation Age of Onset    Hypertension Mother     Elevated Lipids Mother     Diabetes Mother    Cleophas Lanes Mother     Cancer Mother     Lung Disease Mother     Breast Cancer Mother         age 76    Colon Cancer Father     Heart Disease Father     Hypertension Father     Elevated Lipids Father     Cancer Father     Lung Disease Father     Breast Cancer Paternal Aunt 79       Objective   Vital Signs  Visit Vitals  /75 (BP 1 Location: Right upper arm, BP Patient Position: Sitting)   Pulse 87   Temp 99 °F (37.2 °C) (Oral)   Resp 16   Ht 5' 2\" (1.575 m)   SpO2 97%   BMI 38.23 kg/m²       Physical Examination  Physical Exam  Vitals and nursing note reviewed. Constitutional:       General: She is not in acute distress. Appearance: She is obese. She is not toxic-appearing. HENT:      Head: Normocephalic and atraumatic. Eyes:      Conjunctiva/sclera: Conjunctivae normal.      Pupils: Pupils are equal, round, and reactive to light. Cardiovascular:      Rate and Rhythm: Normal rate and regular rhythm. Pulses: Normal pulses. Heart sounds: Normal heart sounds. No murmur heard. Pulmonary:      Effort: Pulmonary effort is normal. No respiratory distress. Breath sounds: Normal breath sounds. No wheezing, rhonchi or rales. Musculoskeletal:         General: Tenderness present. No swelling. Cervical back: Neck supple. Right lower leg: No edema. Left lower leg: No edema. Comments: BL calves measure 42 cm. Minimal TTP in left medial calf. No palpable cord. No obvious swelling, erythema, or warmth. Skin:     General: Skin is warm and dry. Findings: No erythema, lesion or rash. Neurological:      General: No focal deficit present. Mental Status: She is alert and oriented to person, place, and time. Sensory: No sensory deficit. Gait: Gait normal.       Assessment and Plan   Hi Mclean is a 40 y.o. who is here for follow up after ED visit. 1. Elevated blood pressure reading without diagnosis of hypertension - BP elevated to up to 068G systolic at [facility] ED when being seen for leg pain/swelling. wel lcontrolled here at 115/75 which is where her BP typically runs. Likely elevated in the setting of worrying/pain. - Will have her check BP 2-3 days/week at home and keep a log of these values to bring to follow up. - F/U in 2 weeks      2. Pain and swelling of left lower leg - Left medial calf and thigh pain/swelling with suspected palpable cord. Negative D-dimer in [facility] ED with no imaging performed there. Possibly superficial thrombophlebitis vs strain. Low concern for DVT based on exam at this time. Patient advised to monitor symptoms and notify me if they worsen. 3. Obstructive sleep apnea syndrome - Previously diagnosed in 2014 in [place], [place]. S/p recent somnoplasty. Sending for repeat sleep study here. - SLEEP MEDICINE REFERRAL    4. Snoring - See above. - SLEEP MEDICINE REFERRAL    5. Obesity (BMI 30-39.9) - BMI 38.23  - SLEEP MEDICINE REFERRAL      Patient is counseled to return to the office if symptoms do not improve as expected. Urgent consultation with the nearest Emergency Department is strongly recommended if condition worsens. Patient is counseled to follow up as recommended and to inform the office if any changes in treatment are recommended.       I discussed this patient with Dr. Darius Kauffman (Attending Physician)       Signed By:  Parth Tariq DO  Family Medicine Resident

## 2021-10-01 NOTE — PROGRESS NOTES
Sigrid Banks is a 40 y.o. female    Chief Complaint   Patient presents with    Leg Pain     Patient is coming in for left leg pain. Starts from knee to mid calf, at his time it is just a little sore. She went to [address] [place] [address] ED on wednesday and her Blood pressure was elevated. No other concerns. 1. Have you been to the ER, urgent care clinic since your last visit? Hospitalized since your last visit? No    2. Have you seen or consulted any other health care providers outside of the [address] [place] [address] since your last visit? Include any pap smears or colon screening. No      Visit Vitals  /75 (BP 1 Location: Right upper arm, BP Patient Position: Sitting)   Pulse 87   Temp 99 °F (37.2 °C) (Oral)   Resp 16   Ht 5' 2\" (1.575 m)   SpO2 97%   BMI 38.23 kg/m²           Health Maintenance Due   Topic Date Due    Hepatitis C Screening  Never done    COVID-19 Vaccine (1) Never done    Cervical cancer screen  Never done    Colorectal Cancer Screening Combo  Never done    Flu Vaccine (1) 09/01/2021         Medication Reconciliation completed, changes noted.   Please  Update medication list.

## 2021-10-01 NOTE — PATIENT INSTRUCTIONS
Leg Pain: Care Instructions  Your Care Instructions  Many things can cause leg pain. Too much exercise or overuse can cause a muscle cramp (or charley horse). You can get leg cramps from not eating a balanced diet that has enough potassium, calcium, and other minerals. If you do not drink enough fluids or are taking certain medicines, you may develop leg cramps. Other causes of leg pain include injuries, blood flow problems, nerve damage, and twisted and enlarged veins (varicose veins). You can usually ease pain with self-care. Your doctor may recommend that you rest your leg and keep it elevated. Follow-up care is a key part of your treatment and safety. Be sure to make and go to all appointments, and call your doctor if you are having problems. It's also a good idea to know your test results and keep a list of the medicines you take. How can you care for yourself at home? · Take pain medicines exactly as directed. ? If the doctor gave you a prescription medicine for pain, take it as prescribed. ? If you are not taking a prescription pain medicine, ask your doctor if you can take an over-the-counter medicine. · Take any other medicines exactly as prescribed. Call your doctor if you think you are having a problem with your medicine. · Rest your leg while you have pain, and avoid standing for long periods of time. · Prop up your leg at or above the level of your heart when possible. · Make sure you are eating a balanced diet that is rich in calcium, potassium, and magnesium, especially if you are pregnant. · If directed by your doctor, put ice or a cold pack on the area for 10 to 20 minutes at a time. Put a thin cloth between the ice and your skin. · Your leg may be in a splint, a brace, or an elastic bandage, and you may have crutches to help you walk. Follow your doctor's directions about how long to wear supports and how to use the crutches. When should you call for help?    Call 911 anytime you think you may need emergency care. For example, call if:    · You have sudden chest pain and shortness of breath, or you cough up blood.     · Your leg is cool or pale or changes color. Call your doctor now or seek immediate medical care if:    · You have increasing or severe pain.     · Your leg suddenly feels weak and you cannot move it.     · You have signs of a blood clot, such as:  ? Pain in your calf, back of the knee, thigh, or groin. ? Redness and swelling in your leg or groin.     · You have signs of infection, such as:  ? Increased pain, swelling, warmth, or redness. ? Red streaks leading from the sore area. ? Pus draining from a place on your leg. ? A fever.     · You cannot bear weight on your leg. Watch closely for changes in your health, and be sure to contact your doctor if:    · You do not get better as expected. Where can you learn more? Go to http://www.gray.com/  Enter V995 in the search box to learn more about \"Leg Pain: Care Instructions. \"  Current as of: July 1, 2021               Content Version: 13.0  © 8658-8888 [employer]. Care instructions adapted under license by [employer] (which disclaims liability or warranty for this information). If you have questions about a medical condition or this instruction, always ask your healthcare professional. [place] [address] any warranty or liability for your use of this information. Home Blood Pressure Test: About This Test  What is it? A home blood pressure test allows you to keep track of your blood pressure at home. Blood pressure is a measure of the force of blood against the walls of your arteries. Blood pressure readings include two numbers, such as 130/80 (say \"130 over 80\"). The first number is the systolic pressure. The second number is the diastolic pressure. Why is this test done?   You may do this test at home to:  · Find out if you have high blood pressure. · Track your blood pressure if you have high blood pressure. · Track how well medicine is working to reduce high blood pressure. · Check how lifestyle changes, such as weight loss and exercise, are affecting blood pressure. How do you prepare for the test?  For at least 30 minutes before you take your blood pressure, don't exercise, drink caffeine, or smoke. Empty your bladder before the test. Sit quietly with your back straight and both feet on the floor for at least 5 minutes. This helps you take your blood pressure while you feel comfortable and relaxed. How is the test done? · If your doctor recommends it, take your blood pressure twice a day. Take it in the morning and evening. · Sit with your arm slightly bent and resting on a table so that your upper arm is at the same level as your heart. · Use the same arm each time you take your blood pressure. · Place the blood pressure cuff on the bare skin of your upper arm. You may have to roll up your sleeve, remove your arm from the sleeve, or take your shirt off. · Wrap the blood pressure cuff around your upper arm so that the lower edge of the cuff is about 1 inch above the bend of your elbow. · Do not move, talk, or text while you take your blood pressure. Follow the instructions that came with your blood pressure monitor. They might be different from the following. · Press the on/off button on the automatic monitor. Then you may need to wait until the screen says the monitor is ready. · Press the start button. The cuff will inflate and deflate by itself. · Your blood pressure numbers will appear on the screen. · Wait one minute and take your blood pressure again. · If your monitor does not automatically save your numbers, write them in your log book, along with the date and time. Follow-up care is a key part of your treatment and safety. Be sure to make and go to all appointments, and call your doctor if you are having problems.  It's also a good idea to keep a list of the medicines you take. Where can you learn more? Go to http://www.[employer].com/  Enter C427 in the search box to learn more about \"Home Blood Pressure Test: About This Test.\"  Current as of: April 29, 2021               Content Version: 13.0  © 3014-9043 [employer]. Care instructions adapted under license by [employer] (which disclaims liability or warranty for this information). If you have questions about a medical condition or this instruction, always ask your healthcare professional. [address] any warranty or liability for your use of this information. Sleep Studies: About This Test  What is it? Sleep studies are tests that watch what happens to your body during sleep. These studies usually are done in a sleep lab. Sleep labs are often located in hospitals. Sleep studies you do at home can be done with portable equipment. But they may not give the same results as a sleep lab. Why is this test done? Sleep studies may be done if your sleep is not restful or if you are tired all day. These studies can help find sleep problems, such as:  · Sleep apnea. · Excessive snoring. · Problems staying awake, such as narcolepsy. · Problems with nighttime behaviors. These include sleepwalking, night terrors, bed-wetting, and REM behavior disorders (RBD). · Conditions such as periodic limb movement disorder. This is repeated muscle twitching of the feet, arms, or legs during sleep. · Seizures that occur at night (nocturnal seizures). · Ongoing problems that have not responded to treatment. How do you prepare for the test?  · You may be asked to keep a sleep diary before your sleep study.   · Don't take any naps on the day of your test.  · You may be asked to avoid food or drinks with caffeine during the afternoon and evening before your test.  · If the sleep study will be done in a sleep lab, you will be asked to shower or bathe before your test. Don't use sprays, oils, or gels on your hair. Don't wear makeup, fingernail polish, or fake nails. Take a small overnight bag with personal items, such as a toothbrush, a comb, favorite pillows or blankets, and a book. You can wear your own nightclothes. · If you will have portable sleep monitoring, your doctor will explain how to use the equipment at home. How is the test done? · In the sleep lab, you will be in a private room, much like a hotel room. · Small pads or patches called electrodes will be placed on your head and body with a small amount of glue and tape. These will record things like brain activity, eye movement, oxygen levels, and snoring. · Soft elastic belts will be placed around your chest and belly to measure your breathing. · Your blood oxygen levels will be checked by a small clip (oximeter) placed either on the tip of your index finger or on your earlobe. · If you have sleep apnea, you may wear a mask that is connected to a continuous positive airway pressure (CPAP) machine. · Depending on the type of test, you will be allowed to sleep through the night or you'll be awakened periodically and asked to stay awake for a while. · If you use portable sleep monitoring, follow the instructions your doctor gave you. How long does the test take? · You will stay in the sleep lab overnight. For some tests, you will also stay part of the next day. What happens after the test?  · You will be able to go home right away. · You may not sleep well during the test and may be tired the next day. · You can go back to your usual activities. · After your sleep problem has been identified, you may need a second study if your doctor orders treatment such as CPAP. Follow-up care is a key part of your treatment and safety. Be sure to make and go to all appointments, and call your doctor if you are having problems.  It's also a good idea to keep a list of the medicines you take. Ask your doctor when you can expect to have your test results. Where can you learn more? Go to http://www.gray.com/  Enter A010 in the search box to learn more about \"Sleep Studies: About This Test.\"  Current as of: July 6, 2021               Content Version: 13.0  © 2006-2021 [employer]. Care instructions adapted under license by [employer] (which disclaims liability or warranty for this information). If you have questions about a medical condition or this instruction, always ask your healthcare professional. [address] any warranty or liability for your use of this information.

## 2021-10-07 ENCOUNTER — TELEPHONE (OUTPATIENT)
Dept: SLEEP MEDICINE | Age: 46
End: 2021-10-07

## 2021-11-18 ENCOUNTER — TELEPHONE (OUTPATIENT)
Dept: SLEEP MEDICINE | Age: 46
End: 2021-11-18

## 2021-12-13 ENCOUNTER — VIRTUAL VISIT (OUTPATIENT)
Dept: SLEEP MEDICINE | Age: 46
End: 2021-12-13
Payer: OTHER GOVERNMENT

## 2021-12-13 DIAGNOSIS — G47.33 OSA (OBSTRUCTIVE SLEEP APNEA): Primary | ICD-10-CM

## 2021-12-13 PROCEDURE — 99214 OFFICE O/P EST MOD 30 MIN: CPT | Performed by: SPECIALIST

## 2021-12-13 NOTE — PROGRESS NOTES
[address] [facility] [place]. [place], [address]  Tel.  [phone]  Fax. 100 [facility] 60  [address] [facility] [address], [address] [facility]  Tel.  [phone]  Fax. [phone] 9250 [facility] Lacy Barraza   Tel.  [phone]  Fax. [phone]     Maicol Harrison is a 40 y.o. female who was seen by synchronous (real-time) audio-video technology on 12/13/2021. Consent:  She and/or her healthcare decision maker is aware that this patient-initiated Telehealth encounter is a billable service, with coverage as determined by her insurance carrier. She is aware that she may receive a bill and has provided verbal consent to proceed: Yes    I was in the office while conducting this encounter. Chief Complaint       Chief Complaint   Patient presents with    Sleep Problem     Consent to VV appt in [address] [facility] _send link to [email or web address] _NP refd by Dr. Marko Mendoza       [facility]      Maicol Harrison is 40 y.o. female seen for evaluation of a sleep disorder. She notes having had initial evaluation in [place] 7-8 years ago at which time she was told she had \"mild\" sleep apnea. Oral appliance was recommended. She was referred to a dentist but never obtained an appliance. Currently retires at 8: 30 p.m. and will get out of bed at 6: 30 AM.  She may awaken once during the night. She notes some fatigue on awakening. She has a history of snoring which is more prominent supine. Snoring is described as loud, can be heard in separate rooms/through closed doors. She denies vivid dreaming nightmares, sleep talking or sleepwalking, bruxism or nocturnal incontinence, abnormal arm or leg movements, hypnagogic hallucinations, sleep paralysis or cataplexy. [place] Sleepiness Score: (P) 5       No Known Allergies    Current Outpatient Medications   Medication Sig Dispense Refill    Cetirizine (ZYRTEC) 10 mg cap Take  by mouth.       carboxymethylcellulose/citric (PLENITY PO) Take  by mouth. She  has a past medical history of Anemia, Fibromyalgia, HLD (hyperlipidemia), Obesity (BMI 30-39.9), KEYONNA (obstructive sleep apnea), PCOS (polycystic ovarian syndrome), and Prediabetes. She  has a past surgical history that includes hx  section. She family history includes Breast Cancer in her mother; Breast Cancer (age of onset: 79) in her paternal aunt; Cancer in her father and mother; Esha Meres in her father; Diabetes in her mother; Elevated Lipids in her father and mother; Heart Disease in her father; Hypertension in her father and mother; Lung Disease in her father and mother; OSTEOARTHRITIS in her mother. She  reports that she has never smoked. She has never used smokeless tobacco. She reports current alcohol use of about 2.0 standard drinks of alcohol per week. She reports that she does not use drugs. Review of Systems:  ROS      Objective:       General:   Conversant, cooperative   Eyes:  no nystagmus   Oropharynx:   Mallampati score II,  tongue scalloped                   Skin:   no obvious rashes   Neuro:  Speech fluent, face symmetrical, tongue movement normal   Psych:  Normal affect,  normal countenance        Assessment:       ICD-10-CM ICD-9-CM    1. KEYONNA (obstructive sleep apnea)  G47.33 327.23        History of sleep disordered breathing. Details regarding the initial evaluation not available. She will be reevaluated with a home sleep test.  Treatment options for sleep disordered breathing were reviewed. Plan:     No orders of the defined types were placed in this encounter. * Patient has a history and examination consistent with the diagnosis of sleep apnea. *Home sleep testing was ordered   * She was provided information on sleep apnea including corresponding risk factors and the importance of proper treatment. * Treatment options if indicated were reviewed today.       Instructions:  o The patient would benefit from weight reduction measures. o Do not engage in activities requiring a normal degree of alertness if fatigue is present. o The patient understands that untreated or undertreated sleep apnea could impair judgement and the ability to function normally during the day.  o Call or return if symptoms worsen or persist.          Chari Mata MD, [facility]  Electronically signed 12/13/21     Pursuant to the emergency declaration under the [address] [place] [address] waiver authority and the [employer] Act, this Virtual  Visit was conducted, with patient's consent, to reduce the patient's risk of exposure to COVID-19 and provide continuity of care for an established patient. Services were provided through a video synchronous discussion virtually to substitute for in-person clinic visit. Donald Swanson MD     This note was created using voice recognition software. Despite editing, there may be syntax errors. This note will not be viewable in [address]

## 2021-12-21 ENCOUNTER — OFFICE VISIT (OUTPATIENT)
Dept: SLEEP MEDICINE | Age: 46
End: 2021-12-21

## 2021-12-21 ENCOUNTER — HOSPITAL ENCOUNTER (OUTPATIENT)
Dept: SLEEP MEDICINE | Age: 46
Discharge: HOME | End: 2021-12-21
Payer: OTHER GOVERNMENT

## 2021-12-21 DIAGNOSIS — G47.33 OSA (OBSTRUCTIVE SLEEP APNEA): Primary | ICD-10-CM

## 2021-12-21 PROCEDURE — 95806 SLEEP STUDY UNATT&RESP EFFT: CPT | Performed by: SPECIALIST

## 2021-12-21 NOTE — PROGRESS NOTES
217 [facility] [place]. [place], [address]  Tel.  [phone]  Fax. 100 [facility] 60  [place], [address] [facility]  Tel.  [phone]  Fax. [phone] 9250 [facility] [place], [facility] [place]   Tel.  [phone]  Fax. [phone]       S>Elias Lu is a 40 y.o. female seen today to receive a home sleep testing unit (HST). · Patient was educated on proper hookup and operation of the HST. · Instruction forms and documentation were reviewed and signed. · The patient demonstrated good understanding of the HST.    O>    There were no vitals taken for this visit. A>  No diagnosis found. P>  · General information regarding operations and maintenance of the device was provided. · She was provided information on sleep apnea including coresponding risk factors and the importance of proper treatment. · Follow-up appointment was made to return the HST. She will be contacted once the results have been reviewed. · She was asked to contact our office for any problems regarding her home sleep test study.    · [facility] # [id number]

## 2021-12-26 ENCOUNTER — TELEPHONE (OUTPATIENT)
Dept: SLEEP MEDICINE | Age: 46
End: 2021-12-26

## 2021-12-26 DIAGNOSIS — G47.33 OSA (OBSTRUCTIVE SLEEP APNEA): Primary | ICD-10-CM

## 2021-12-26 NOTE — TELEPHONE ENCOUNTER
HSAT: Labeled as failed due to prolonged absence of pulse ox signal.    Recommendation: Attended polysomnogram    Sleep technologist: Please advise patient of HSAT results. Order has been entered for attended PSG.

## 2022-01-03 ENCOUNTER — PATIENT MESSAGE (OUTPATIENT)
Dept: SLEEP MEDICINE | Age: 47
End: 2022-01-03

## 2022-01-03 DIAGNOSIS — G47.31 CENTRAL SLEEP APNEA: ICD-10-CM

## 2022-01-03 DIAGNOSIS — G47.33 OSA (OBSTRUCTIVE SLEEP APNEA): Primary | ICD-10-CM

## 2022-01-05 NOTE — TELEPHONE ENCOUNTER
Reviewed sleep study results with patient. She expressed understanding and is willing to proceed with a PSG study. Please PSG study.     Thanks

## 2022-01-26 ENCOUNTER — HOSPITAL ENCOUNTER (OUTPATIENT)
Dept: SLEEP MEDICINE | Age: 47
Discharge: HOME | End: 2022-01-26
Payer: OTHER GOVERNMENT

## 2022-01-26 VITALS
OXYGEN SATURATION: 100 % | DIASTOLIC BLOOD PRESSURE: 75 MMHG | TEMPERATURE: 97.8 F | SYSTOLIC BLOOD PRESSURE: 112 MMHG | BODY MASS INDEX: 38.46 KG/M2 | WEIGHT: 209 LBS | HEIGHT: 62 IN | HEART RATE: 84 BPM

## 2022-01-26 DIAGNOSIS — G47.33 OSA (OBSTRUCTIVE SLEEP APNEA): ICD-10-CM

## 2022-01-26 PROCEDURE — 95810 POLYSOM 6/> YRS 4/> PARAM: CPT | Performed by: SPECIALIST

## 2022-01-27 ENCOUNTER — DOCUMENTATION ONLY (OUTPATIENT)
Dept: SLEEP MEDICINE | Age: 47
End: 2022-01-27

## 2022-01-27 NOTE — PROGRESS NOTES
[address] [facility] [address] [place]. [place], [address]  Tel.  [phone]  Fax. 100 [facility] 60  [place], 200 [address] [facility]  Tel.  [phone]  Fax. [phone] 9250 [facility] Lacy Barraza  Tel.  [phone]  Fax. [phone]     Sleep Study Technical Notes        PRE-Test:  Kole Valdez (: 1975) arrived in the lobby. Patient was greeted, temperature checked (97.8 ) and screening questions asked. The patient was taken to the Sleep Center and taken directly to his/her room. BP (112/75) and SaO2 (100%) were taken. Weight per patient (209lb). Procedure explained to the patient and questions were answered. The patient expressed understanding of the procedure. Electrodes were applied without incident. The patient was placed in bed and the study was started.  PAP mask acclimation for **min. Patient didn't tolerate mask. Acquisition Notes:   Lights off: 9:30pm     Respiratory events: bridgette, hypopnea,and central apnea   ECG:  NSR,  62- 73bpm   Snoring: Moderate snore noted   PAP titration: N/A   Desensitization Mask(s) Used: N/A   Other comments: PT slept well, some apnea, and snoring noted  o Patient had caregiver in attendance:  N  o Patient watched TV or on phone after lights out for ** hours  o Patient slept with positional therapy: N  o Patient slept with head of bed elevated:  N  o Patient wore an oral appliance:  N  o Patient to bathroom 0 times  o Pediatric Patient:  - Parent accompanied patient: N  - Parent slept in bed with patient: N      POST Test:   Patient was awakened. Electrodes were removed. The patient was discharged after answering the Post Questionnaire. Patient stated that she was alert and ok to drive.  Equipment and room cleaned per infection control policy.

## 2022-01-31 ENCOUNTER — DOCUMENTATION ONLY (OUTPATIENT)
Dept: SLEEP MEDICINE | Age: 47
End: 2022-01-31

## 2022-01-31 ENCOUNTER — TELEPHONE (OUTPATIENT)
Dept: SLEEP MEDICINE | Age: 47
End: 2022-01-31

## 2022-01-31 NOTE — TELEPHONE ENCOUNTER
PSG performed for potential sleep disordered breathing. 501 minutes recorded of which 396 minutes spent asleep with a sleep efficiency of 79%. Sleep onset at 53.3 minutes; REM onset at 67 minutes with total REM representing 29.7% of sleep time. All sleep stages were observed. 69 respiratory events occurred of which 18 hypopnea and 51 apnea (39 central, 12 obstructive). The overall AHI of 10.5/h. Events more prominently in rem with the REM-related AHI of 23.5/h. Minimal SaO2 85%. Mild snoring noted. Impression: Sleep disordered breathing more prominent in REM sleep consisting of central and obstructive events. Recommendation: Titration study    Sleep technologist: Please advise patient of PSG results. Order has been entered for titration study, COVID testing.

## 2022-03-29 ENCOUNTER — OFFICE VISIT (OUTPATIENT)
Dept: FAMILY MEDICINE CLINIC | Age: 47
End: 2022-03-29
Payer: OTHER GOVERNMENT

## 2022-03-29 VITALS
RESPIRATION RATE: 16 BRPM | HEART RATE: 100 BPM | TEMPERATURE: 98.5 F | BODY MASS INDEX: 38.23 KG/M2 | DIASTOLIC BLOOD PRESSURE: 70 MMHG | HEIGHT: 62 IN | SYSTOLIC BLOOD PRESSURE: 106 MMHG | OXYGEN SATURATION: 98 %

## 2022-03-29 DIAGNOSIS — R21 RASH: Primary | ICD-10-CM

## 2022-03-29 PROCEDURE — 99214 OFFICE O/P EST MOD 30 MIN: CPT | Performed by: STUDENT IN AN ORGANIZED HEALTH CARE EDUCATION/TRAINING PROGRAM

## 2022-03-29 RX ORDER — PREDNISONE 20 MG/1
40 TABLET ORAL
Qty: 10 TABLET | Refills: 0 | Status: SHIPPED | OUTPATIENT
Start: 2022-03-29 | End: 2022-04-03

## 2022-03-29 RX ORDER — FLUTICASONE PROPIONATE 50 MCG
2 SPRAY, SUSPENSION (ML) NASAL DAILY
COMMUNITY

## 2022-03-29 NOTE — PROGRESS NOTES
Jorge Rivas is a 40 y.o. female    Chief Complaint   Patient presents with    Rash     Patient is coming in for a rash on her chest, face and back. She states that the bumps are fine and red. This started since 03/15/2021. She states that she took some benadryl and it is somewhat helping with the itching. No other concerns. 1. Have you been to the ER, urgent care clinic since your last visit? Hospitalized since your last visit? No  2. Have you seen or consulted any other health care providers outside of the [address] [place] [address] since your last visit? Include any pap smears or colon screening. No      Visit Vitals  /70 (BP 1 Location: Right upper arm, BP Patient Position: Sitting)   Pulse 100   Temp 98.5 °F (36.9 °C) (Oral)   Resp 16   Ht 5' 2\" (1.575 m)   SpO2 98%   BMI 38.23 kg/m²           Health Maintenance Due   Topic Date Due    Hepatitis C Screening  Never done    Depression Screen  Never done    COVID-19 Vaccine (1) Never done    Cervical cancer screen  Never done    Colorectal Cancer Screening Combo  Never done    Flu Vaccine (1) 09/01/2021    Breast Cancer Screen Mammogram  03/09/2022         Medication Reconciliation completed, changes noted.   Please  Update medication list.

## 2022-03-29 NOTE — PROGRESS NOTES
Demetria Hilario  40 y.o. female  1975  [id number]  [address] [facility]  Progress Note     Encounter Date: 3/29/2022    Assessment and Plan:     Encounter Diagnoses     ICD-10-CM ICD-9-CM   1. Rash  R21 782.1       1. Rash  - Urticarial in nature. Will treat with oral steroids  - Recommend benadryl daily and hypoallergenic washes/ cleansers  - ER precautions discussed  - Follow up in 1 week if symptoms fail to improve   - predniSONE (DELTASONE) 20 mg tablet; Take 40 mg by mouth daily (with breakfast) for 5 days. Dispense: 10 Tablet; Refill: 0      I have discussed the diagnosis with the patient and the intended plan as seen in the above orders. she has expressed understanding. The patient has received an after-visit summary and questions were answered concerning future plans. I have discussed medication side effects and warnings with the patient as well. Electronically Signed: Calin Chau, DO    Current Medications after this visit     Current Outpatient Medications   Medication Sig    fluticasone propionate (Flonase Allergy Relief) 50 mcg/actuation nasal spray 2 Sprays by Both Nostrils route daily.  predniSONE (DELTASONE) 20 mg tablet Take 40 mg by mouth daily (with breakfast) for 5 days.  Cetirizine (ZYRTEC) 10 mg cap Take  by mouth.  carboxymethylcellulose/citric (PLENITY PO) Take  by mouth. No current facility-administered medications for this visit. There are no discontinued medications. ~~~~~~~~~~~~~~~~~~~~~~~~~~~~~~~~~~~~~~~~~~~~~~~~~~~~~~~~~~~    Chief Complaint   Patient presents with    Rash     Patient is coming in for a rash on her chest, face and back. She states that the bumps are fine and red. This started since 03/15/2021. She states that she took some benadryl and it is somewhat helping with the itching. No other concerns.         History provided by patient  History of Present Illness   Demetria Hilario is a 40 y.o. female who presents to clinic today for:  Rash (Patient is coming in for a rash on her chest, face and back. She states that the bumps are fine and red. This started since 03/15/2021. She states that she took some benadryl and it is somewhat helping with the itching. No other concerns. )      Patient presents with a rash that started on 3/15  Located on her back, chest, and face  It is itchy and feels slightly burning because of the scratching  Never happened before  Tried benadryl and hydrocortisone cream prn  Denies any new medications, foods, spray or dyes  Is under a lot of stress as of late    Health Maintenance    Health Maintenance Due   Topic Date Due    Hepatitis C Screening  Never done    Depression Screen  Never done    Cervical cancer screen  Never done    Colorectal Cancer Screening Combo  Never done    Flu Vaccine (1) 09/01/2021    Breast Cancer Screen Mammogram  03/09/2022     Review of Systems   Review of Systems   Constitutional: Negative for chills and fever. Respiratory: Negative for cough and shortness of breath. Gastrointestinal: Negative for abdominal pain, nausea and vomiting. Skin: Positive for itching and rash. Vitals/Objective:     Vitals:    03/29/22 1404   BP: 106/70   Pulse: 100   Resp: 16   Temp: 98.5 °F (36.9 °C)   TempSrc: Oral   SpO2: 98%   Height: 5' 2\" (1.575 m)     Body mass index is 38.23 kg/m². Wt Readings from Last 3 Encounters:   01/26/22 209 lb (94.8 kg)   02/26/21 209 lb (94.8 kg)   12/19/19 186 lb 6.4 oz (84.6 kg)         Objective  Physical Exam  Constitutional:       General: She is not in acute distress. Appearance: She is not ill-appearing. HENT:      Head: Normocephalic and atraumatic. Pulmonary:      Effort: Pulmonary effort is normal. No respiratory distress. Skin:     General: Skin is warm. Findings: Erythema and rash present. Rash is urticarial.   Neurological:      General: No focal deficit present.       Mental Status: She is oriented to person, place, and time. No results found for this or any previous visit (from the past 24 hour(s)). Disposition     Follow-up and Dispositions  ·   Return in about 1 week (around 2022) for Allergy follow up. No future appointments. History   Patient's past medical, surgical and family histories were reviewed and updated. Past Medical History:   Diagnosis Date    Anemia     Fibromyalgia     HLD (hyperlipidemia)     Obesity (BMI 30-39. 9)     KEYONNA (obstructive sleep apnea)     PCOS (polycystic ovarian syndrome)     Prediabetes      Past Surgical History:   Procedure Laterality Date    HX  SECTION       Family History   Problem Relation Age of Onset    Hypertension Mother    [place] Elevated Lipids Mother     Diabetes Mother     OSTEOARTHRITIS Mother     Cancer Mother     Lung Disease Mother    [place] Breast Cancer Mother         age 76    Colon Cancer Father     Heart Disease Father     Hypertension Father     Elevated Lipids Father     Cancer Father     Lung Disease Father     Breast Cancer Paternal Aunt 79     Social History     Tobacco Use    Smoking status: Never Smoker    Smokeless tobacco: Never Used   Substance Use Topics    Alcohol use:  Yes     Alcohol/week: 2.0 standard drinks     Types: 2 Standard drinks or equivalent per week    Drug use: Never       Allergies   No Known Allergies

## 2022-04-08 ENCOUNTER — TELEPHONE (OUTPATIENT)
Dept: SLEEP MEDICINE | Age: 47
End: 2022-04-08

## 2022-04-08 ENCOUNTER — PATIENT MESSAGE (OUTPATIENT)
Dept: SLEEP MEDICINE | Age: 47
End: 2022-04-08

## 2022-04-08 ENCOUNTER — TELEPHONE (OUTPATIENT)
Dept: FAMILY MEDICINE CLINIC | Age: 47
End: 2022-04-08

## 2022-04-08 NOTE — TELEPHONE ENCOUNTER
Good Morning,   Patient needs to schedule a follow up appointment to be evaluated in person in regards to her rash. Can y'all please schedule her for a in-person visit.    Thank you

## 2022-04-08 NOTE — TELEPHONE ENCOUNTER
Left voicemail to review sleep study results and [employer] message sent. Please schedule Titration.     Thanks

## 2022-04-08 NOTE — TELEPHONE ENCOUNTER
----- Message from Reema Section sent at 4/7/2022  2:46 PM EDT -----  Subject: Message to Provider    QUESTIONS  Information for Provider? please call patient reg rash; (pt had a visit   back on 3/29/22 and symptoms are not getting better)  ---------------------------------------------------------------------------  --------------  CALL BACK INFO  What is the best way for the office to contact you? OK to leave message on   voicemail  Preferred Call Back Phone Number? [phone]  ---------------------------------------------------------------------------  --------------  SCRIPT ANSWERS  Relationship to Patient?  Self

## 2022-04-12 ENCOUNTER — OFFICE VISIT (OUTPATIENT)
Dept: FAMILY MEDICINE CLINIC | Age: 47
End: 2022-04-12
Payer: OTHER GOVERNMENT

## 2022-04-12 VITALS
DIASTOLIC BLOOD PRESSURE: 85 MMHG | RESPIRATION RATE: 18 BRPM | HEART RATE: 91 BPM | OXYGEN SATURATION: 98 % | TEMPERATURE: 97.9 F | SYSTOLIC BLOOD PRESSURE: 125 MMHG

## 2022-04-12 DIAGNOSIS — L50.1 CHRONIC IDIOPATHIC URTICARIA: Primary | ICD-10-CM

## 2022-04-12 PROCEDURE — 99214 OFFICE O/P EST MOD 30 MIN: CPT | Performed by: STUDENT IN AN ORGANIZED HEALTH CARE EDUCATION/TRAINING PROGRAM

## 2022-04-12 RX ORDER — MONTELUKAST SODIUM 10 MG/1
10 TABLET ORAL DAILY
Qty: 90 TABLET | Refills: 1 | Status: SHIPPED | OUTPATIENT
Start: 2022-04-12

## 2022-04-12 RX ORDER — CETIRIZINE HYDROCHLORIDE 10 MG/1
20 CAPSULE, LIQUID FILLED ORAL DAILY
Qty: 60 CAPSULE | Refills: 2 | Status: SHIPPED | OUTPATIENT
Start: 2022-04-12

## 2022-04-12 NOTE — PATIENT INSTRUCTIONS
Chronic Hives: Care Instructions  Overview  Chronic hives are long-lasting raised, red, and itchy patches of skin. Hives usually have red borders and pale centers. They range in size from ¼ inch to 3 inches or more across. They may seem to move from place to place on the skin. Several hives may join to form a large area of raised, red skin. When hives and swelling last more than 6 weeks even with treatment, they are called chronic. Hives may occur with swelling under the skin. But you may have swelling without hives. Swelling may hurt a bit, but it does not usually itch like hives. You cannot spread hives to other people. Follow-up care is a key part of your treatment and safety. Be sure to make and go to all appointments, and call your doctor if you are having problems. It's also a good idea to know your test results and keep a list of the medicines you take. How can you care for yourself at home? · Avoid whatever you think may have caused your hives, such as a certain food or medicine. But you may not know the cause. · Put a cool, wet towel on the area to relieve itching. · Your doctor may suggest a nondrowsy antihistamine, such as loratadine (Claritin), to help control the hives. Be safe with medicines. Read and follow all instructions on the label. · Your doctor may prescribe a shot of epinephrine to carry with you in case you have a severe reaction. Learn how to give yourself the shot, and keep it with you at all times. Make sure it has not . · If your doctor prescribes another medicine, take it exactly as directed. When should you call for help? Give an epinephrine shot if:    · You think you are having a severe allergic reaction. After giving an epinephrine shot call 911, even if you feel better. Call 911 if:    · You have symptoms of a severe allergic reaction. These may include:  ? Sudden raised, red areas (hives) all over your body. ?  Swelling of the throat, mouth, lips, or tongue. ? Trouble breathing. ? Passing out (losing consciousness). Or you may feel very lightheaded or suddenly feel weak, confused, or restless.     · You have been given an epinephrine shot, even if you feel better. Call your doctor now or seek immediate medical care if:    · Your hives get worse. Watch closely for changes in your health, and be sure to contact your doctor if:    · You do not get better as expected. Where can you learn more? Go to http://www.buckley.com/  Enter A461 in the search box to learn more about \"Chronic Hives: Care Instructions. \"  Current as of: November 15, 2021               Content Version: 13.2  © 2006-2022 [employer], [employer]. Care instructions adapted under license by [employer] (which disclaims liability or warranty for this information). If you have questions about a medical condition or this instruction, always ask your healthcare professional. [address] any warranty or liability for your use of this information.

## 2022-04-12 NOTE — PROGRESS NOTES
Barbara Dang  40 y.o. female  1975  CTB:[id number]  [address] [place] [address] CTR  Progress Note     Encounter Date: 4/12/2022    Assessment and Plan:     Encounter Diagnoses     ICD-10-CM ICD-9-CM   1. Chronic idiopathic urticaria  L50.1 708.1       1. Chronic idiopathic urticaria  - Present for 5-6 weeks. Responded to steroids but returned quickly after finished treatment. Will start on a higher dose of her daily antihistamine and add montelukast to her therapy  - Discussed the possibility of heat/cold being a trigger. Instructed patient to keep a log of symptoms  - Encouraged use of hypoallergenic products  - Will send to allergy for testing to find etiology of symptoms  - Patient to follow up with me in 4 weeks to reassess symptoms  - REFERRAL TO ALLERGY  - Cetirizine (ZyrTEC) 10 mg cap; Take 20 mg by mouth daily. Dispense: 60 Capsule; Refill: 2  - montelukast (SINGULAIR) 10 mg tablet; Take 1 Tablet by mouth daily. Dispense: 90 Tablet; Refill: 1      I have discussed the diagnosis with the patient and the intended plan as seen in the above orders. she has expressed understanding. The patient has received an after-visit summary and questions were answered concerning future plans. I have discussed medication side effects and warnings with the patient as well. Electronically Signed: Lucio Blackwell, DO    Current Medications after this visit     Current Outpatient Medications   Medication Sig    Cetirizine (ZyrTEC) 10 mg cap Take 20 mg by mouth daily.  montelukast (SINGULAIR) 10 mg tablet Take 1 Tablet by mouth daily.  fluticasone propionate (Flonase Allergy Relief) 50 mcg/actuation nasal spray 2 Sprays by Both Nostrils route daily. No current facility-administered medications for this visit.      Medications Discontinued During This Encounter   Medication Reason    carboxymethylcellulose/citric (PLENITY PO) Therapy Completed    Cetirizine (ZYRTEC) 10 mg cap REORDER ~~~~~~~~~~~~~~~~~~~~~~~~~~~~~~~~~~~~~~~~~~~~~~~~~~~~~~~~~~~    Chief Complaint   Patient presents with    Rash     Follow up       History provided by patient  History of Present Illness   Alexy Montalvo is a 40 y.o. female who presents to clinic today for:  Rash (Follow up)      Patient presents for follow up of a rash   Has been present for 5-6 weeks now  Was treated with steroids 2 weeks ago and reports initial improvement however symptoms returned a few days after. Was initially blotchy and located on her chest and face. Rash is itchy. Has been using benadryl here and there on top of her daily Zyrtec,  Has been using clean and clear. Stays away from fragrances and dyes  Does have pets at home  Has a hx of seasonal allergy  Denies any respiratory compromise    Health Maintenance    Health Maintenance Due   Topic Date Due    Hepatitis C Screening  Never done    Depression Screen  Never done    Cervical cancer screen  Never done    Colorectal Cancer Screening Combo  Never done    Breast Cancer Screen Mammogram  03/09/2022     Review of Systems   Review of Systems   Constitutional: Negative for chills and fever. Respiratory: Negative for cough, shortness of breath and wheezing. Cardiovascular: Negative for chest pain and palpitations. Gastrointestinal: Negative for abdominal pain, nausea and vomiting. Skin: Positive for itching and rash. Vitals/Objective:     Vitals:    04/12/22 1104   BP: 125/85   Pulse: 91   Resp: 18   Temp: 97.9 °F (36.6 °C)   TempSrc: Oral   SpO2: 98%     There is no height or weight on file to calculate BMI. Wt Readings from Last 3 Encounters:   01/26/22 209 lb (94.8 kg)   02/26/21 209 lb (94.8 kg)   12/19/19 186 lb 6.4 oz (84.6 kg)         Objective  Physical Exam  Constitutional:       General: She is not in acute distress. HENT:      Head: Normocephalic and atraumatic.       Mouth/Throat:      Mouth: Mucous membranes are moist.      Pharynx: No oropharyngeal exudate or posterior oropharyngeal erythema. Skin:     General: Skin is warm. Findings: Erythema and rash present. Rash is papular and urticarial.                 No results found for this or any previous visit (from the past 24 hour(s)). Disposition     Follow-up and Dispositions  ·   Return in about 4 weeks (around 5/10/2022) for follow up. Future Appointments   Date Time Provider Loreto Johns   2022  8:00 PM BEDROOM [address] [place] [address] LAB HU       History   Patient's past medical, surgical and family histories were reviewed and updated. Past Medical History:   Diagnosis Date    Anemia     Fibromyalgia     HLD (hyperlipidemia)     Obesity (BMI 30-39. 9)     KEYONNA (obstructive sleep apnea)     PCOS (polycystic ovarian syndrome)     Prediabetes      Past Surgical History:   Procedure Laterality Date    HX  SECTION       Family History   Problem Relation Age of Onset    Hypertension Mother    Ashley Orr Elevated Lipids Mother     Diabetes Mother     OSTEOARTHRITIS Mother     Cancer Mother     Lung Disease Mother    Ashley Orr Breast Cancer Mother         age 76    Colon Cancer Father     Heart Disease Father     Hypertension Father     Elevated Lipids Father     Cancer Father     Lung Disease Father     Breast Cancer Paternal Aunt 79     Social History     Tobacco Use    Smoking status: Never Smoker    Smokeless tobacco: Never Used   Substance Use Topics    Alcohol use:  Yes     Alcohol/week: 2.0 standard drinks     Types: 2 Standard drinks or equivalent per week    Drug use: Never       Allergies   No Known Allergies

## 2022-04-12 NOTE — PROGRESS NOTES
Chief Complaint   Patient presents with    Rash     Follow up     Visit Vitals  /85 (BP 1 Location: Left arm, BP Patient Position: Sitting, BP Cuff Size: Adult)   Pulse 91   Temp 97.9 °F (36.6 °C) (Oral)   Resp 18   SpO2 98%     1. Have you been to the ER, urgent care clinic since your last visit? Hospitalized since your last visit? NO    2. Have you seen or consulted any other health care providers outside of the [address] [place] [address] since your last visit? Include any pap smears or colon screening.  NO

## 2022-05-04 ENCOUNTER — HOSPITAL ENCOUNTER (OUTPATIENT)
Dept: SLEEP MEDICINE | Age: 47
Discharge: HOME | End: 2022-05-04
Payer: OTHER GOVERNMENT

## 2022-05-04 VITALS
OXYGEN SATURATION: 99 % | WEIGHT: 209 LBS | HEIGHT: 62 IN | BODY MASS INDEX: 38.46 KG/M2 | TEMPERATURE: 98.6 F | DIASTOLIC BLOOD PRESSURE: 77 MMHG | SYSTOLIC BLOOD PRESSURE: 120 MMHG | HEART RATE: 79 BPM

## 2022-05-04 DIAGNOSIS — G47.33 OSA (OBSTRUCTIVE SLEEP APNEA): ICD-10-CM

## 2022-05-04 DIAGNOSIS — G47.31 CENTRAL SLEEP APNEA: ICD-10-CM

## 2022-05-04 PROCEDURE — 95811 POLYSOM 6/>YRS CPAP 4/> PARM: CPT | Performed by: SPECIALIST

## 2022-05-05 ENCOUNTER — DOCUMENTATION ONLY (OUTPATIENT)
Dept: SLEEP MEDICINE | Age: 47
End: 2022-05-05

## 2022-05-05 ENCOUNTER — TELEPHONE (OUTPATIENT)
Dept: SLEEP MEDICINE | Age: 47
End: 2022-05-05

## 2022-05-05 DIAGNOSIS — G47.33 OSA (OBSTRUCTIVE SLEEP APNEA): Primary | ICD-10-CM

## 2022-05-05 NOTE — PROGRESS NOTES
[address] [place]. [place], [address]  Tel.  [phone]  Fax. [address] [facility] [address]  [place], [address] [facility]  Tel.  [phone]  Fax. [phone] [address]  Tel.  [phone]  Fax. [phone]     Sleep Study Technical Notes        PRE-Test:  Alease Cranker (: 1975) arrived in the lobby. Patient was greeted and screening questions asked. The patient was taken to the Sleep Center and taken directly to his/her room.  Vitals:  o Temperature (98.6)   o BP (120/77)   o SaO2 (99%)   o Weight per patient (209lb)   Procedure explained to the patient and questions were answered. The patient expressed understanding of the procedure. Electrodes were applied without incident. The patient was placed in bed and the study was started.  PAP mask acclimation for **min. Patient did tolerate mask.  Sleep aid taken:  N      Acquisition Notes:   Lights off: 10:05pm     Respiratory events: hypopnea, central   ECG:  nsr, 65-73bpm   Snoring:  Mild snoring noted   PAP titration: CPAP TITRATION   o Eliminated events:HYPOPNEA, CENTRAL  o Reduced events:  o Events at  final pressure 12cm   Desensitization Mask(s) Used: Started with RESMED AIRFIT P30I NASAL PILLOW SIZE (S)/ and Switch to RESMED AIRFIT F30I FF SIZE (M)   Positional therapy with: Other comments: PT slept well, CPAP  12cm remove apnea, and stop PT snoriing  o Patient had caregiver in attendance:  N  o Patient watched TV or on phone after lights out for ** hours  o Patient slept with positional therapy:  Y used  2 pillows  o Patient slept with head of bed elevated:  N  o Patient wore an oral appliance:  N  o Patient to bathroom 2 times  o Pediatric Patient:  - Parent accompanied patient: N  - Parent slept in bed with patient: N      POST Test:   Patient was awakened. Electrodes were removed. The patient was discharged after answering the Post Questionnaire.   Patient stated that he was alert and ok to drive.  Equipment and room cleaned per infection control policy.

## 2022-05-09 NOTE — TELEPHONE ENCOUNTER
Titration study performed. PSG demonstrated 69 respiratory events of which 18 hypopnea and 51 apnea (39 central, 12 obstructive). The overall AHI of 10.5/h. Events more prominently in rem with the REM-related AHI of 23.5/h. Minimal SaO2 85%. Mild snoring noted. 471.6 minutes recorded of which 355.5 minutes spent asleep with a sleep efficiency of 75.4%. Sleep onset at 52.9 minutes; REM onset at 41.5 minutes with total REM representing 24.3% of sleep time. 25 respiratory events occurred of which 11 hypopnea and 14 apnea (11 central, 3 obstructive). Overall AHI 4.2/h. Events more prominently supine. Supine related AHI 11.6/h. Minimal SaO2 84%. CPAP initiated at 4 cm and increased to 12 cm. At 12 cm CPAP: 134.9 minutes recorded of which 98.7 minutes spent asleep and 38.5 minutes in rem. Corresponding AHI 0.6/h. Minimal SaO2 93%. Patient primarily lateral at this pressure setting. Impression: Sleep disordered breathing responding to CPAP increased to 12 cm. Sleep technologist: Please advise patient of study results. Order has been entered for CPAP at 12 cm. Please schedule first compliance appointment.

## 2022-05-17 ENCOUNTER — DOCUMENTATION ONLY (OUTPATIENT)
Dept: SLEEP MEDICINE | Age: 47
End: 2022-05-17

## 2022-05-17 NOTE — TELEPHONE ENCOUNTER
Reviewed sleep study results with patient. She expressed understanding and is willing to proceed with a trial of CPAP at [facility].

## 2022-06-17 ENCOUNTER — HOSPITAL ENCOUNTER (OUTPATIENT)
Dept: LAB | Age: 47
Discharge: HOME | End: 2022-06-17
Payer: OTHER GOVERNMENT

## 2022-06-17 ENCOUNTER — OFFICE VISIT (OUTPATIENT)
Dept: FAMILY MEDICINE CLINIC | Age: 47
End: 2022-06-17
Payer: OTHER GOVERNMENT

## 2022-06-17 VITALS
OXYGEN SATURATION: 98 % | SYSTOLIC BLOOD PRESSURE: 122 MMHG | HEART RATE: 87 BPM | TEMPERATURE: 98 F | HEIGHT: 62 IN | BODY MASS INDEX: 38.23 KG/M2 | DIASTOLIC BLOOD PRESSURE: 79 MMHG | RESPIRATION RATE: 16 BRPM

## 2022-06-17 DIAGNOSIS — Z87.898 HISTORY OF ABNORMAL MAMMOGRAM: ICD-10-CM

## 2022-06-17 DIAGNOSIS — Z11.59 NEED FOR HEPATITIS C SCREENING TEST: ICD-10-CM

## 2022-06-17 DIAGNOSIS — Z01.419 WOMEN'S ANNUAL ROUTINE GYNECOLOGICAL EXAMINATION: ICD-10-CM

## 2022-06-17 DIAGNOSIS — Z12.11 SCREEN FOR COLON CANCER: ICD-10-CM

## 2022-06-17 DIAGNOSIS — Z01.419 WOMEN'S ANNUAL ROUTINE GYNECOLOGICAL EXAMINATION: Primary | ICD-10-CM

## 2022-06-17 PROCEDURE — 88175 CYTOPATH C/V AUTO FLUID REDO: CPT

## 2022-06-17 PROCEDURE — 87624 HPV HI-RISK TYP POOLED RSLT: CPT

## 2022-06-17 PROCEDURE — 1220F PT SCREENED FOR DEPRESSION: CPT | Performed by: FAMILY MEDICINE

## 2022-06-17 PROCEDURE — 99396 PREV VISIT EST AGE 40-64: CPT | Performed by: FAMILY MEDICINE

## 2022-06-17 NOTE — PROGRESS NOTES
Colton Mercer  56 y.o. female  1975  RJP:[id number]  [facility] CTR  Progress Note     Assessment and Plan:    1. Women's annual routine gynecological examination  Patient will check if she is UTD on Tdap and will get if needed. Diet, exercise and safety discussed with patient. Diagnoses and plans were discussed with the patient. After visit summary given to the patient as well. - AL PATIENT SCREENED FOR DEPRESSION  - JANEE MAMMO BI SCREENING INCL CAD; Future  - LIPID PANEL; Future  - METABOLIC PANEL, COMPREHENSIVE; Future  - CBC W/O DIFF; Future  - HEMOGLOBIN A1C WITH EAG; Future  - diphth,pertus,acell,,tetanus (BOOSTRIX TDAP) 2.5-8-5 Lf-mcg-Lf/0.5mL susp suspension; 0.5 mL by IntraMUSCular route once for 1 dose. Dispense: 0.5 mL; Refill: 0  - PAP IG, APTIMA HPV AND RFX 16/18,45 (120817); Future    2. Need for hepatitis C screening test  - HEPATITIS C AB; Future    3. Screen for colon cancer  - REFERRAL FOR COLONOSCOPY    RTC in July for [facility] visit to discuss multiple joints pain. Per patient she had negative rheum work up in the past.     Ryder Nance MD    Colton Mercer is a 56 y.o. female who had concerns including Complete Physical (Patient is coming in for a complete physical. Patient wants a pap today. She need a rederral for a mammogram and a referral for a colonscopy. Patient is also requesting labs. No other concerns. ).     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 There is no problem list on file for this patient. Prior to Admission medications    Medication Sig Start Date End Date Taking? Authorizing Provider   diphth,pertus,acell,,tetanus (BOOSTRIX TDAP) 2.5-8-5 Lf-mcg-Lf/0.5mL susp suspension 0.5 mL by IntraMUSCular route once for 1 dose. 6/17/22 6/17/22 Yes Shavonne Hope MD   Cetirizine (ZyrTEC) 10 mg cap Take 20 mg by mouth daily. 4/12/22  Yes Thomas Roman DO   fluticasone propionate (Flonase Allergy Relief) 50 mcg/actuation nasal spray 2 Sprays by Both Nostrils route daily. Yes Provider, Historical   montelukast (SINGULAIR) 10 mg tablet Take 1 Tablet by mouth daily. Patient not taking: Reported on 6/17/2022 4/12/22   Thomas Roman DO          No Known Allergies      Smoker:   Social History     Tobacco Use   Smoking Status Never Smoker   Smokeless Tobacco Never Used     ETOH:   Social History     Substance and Sexual Activity   Alcohol Use Yes    Alcohol/week: 2.0 standard drinks    Types: 2 Standard drinks or equivalent per week       FH:    Family History   Problem Relation Age of Onset    Hypertension Mother    Villafuerte Elevated Lipids Mother     Diabetes Mother     OSTEOARTHRITIS Mother     Cancer Mother     Lung Disease Mother    Villafuerte Breast Cancer Mother         age 76    Colon Cancer Father     Heart Disease Father     Hypertension Father     Elevated Lipids Father     Cancer Father     Lung Disease Father     Breast Cancer Paternal Aunt 79       Review of Systems   Constitutional: Negative for malaise/fatigue. HENT: Negative for congestion. Respiratory: Negative for cough. Cardiovascular: Negative for palpitations and leg swelling. Gastrointestinal: Negative for abdominal pain, nausea and vomiting. Musculoskeletal: Negative for myalgias. Skin: Negative for rash. Neurological: Negative for headaches.    Psychiatric/Behavioral: The patient does not have insomnia. Physical Exam:  Visit Vitals  /79 (BP 1 Location: Right upper arm, BP Patient Position: Sitting)   Pulse 87   Temp 98 °F (36.7 °C) (Oral)   Resp 16   Ht 5' 2\" (1.575 m)   SpO2 98%   BMI 38.23 kg/m²       Physical Examination:  Physical Exam Examination chaperoned by Masha Palma MA. General appearance - alert, well appearing, and in no distress, oriented to person, place, and time and normal appearing weight  Mental status -  normal mood, behavior, speech, dress, motor activity, and thought processes, no expressed suicidal or homicidal ideation, no hallucinations  Ears - bilateral TM's and external ear canals normal  Nose - normal and patent, no erythema, discharge or polyps  Mouth - mucous membranes moist, pharynx normal without lesions  Neck - supple, no significant adenopathy  Breast-deferred, getting mammo  Chest - normal chest excursion, normal inspiratory and expiratory function. Clear to ausculation bilaterally. Heart - normal rate, regular rhythm, normal S1, S2, no murmurs, rubs, clicks or gallops, no extremity edema  Abdomen- benign, no organomegaly or masses  GYN-Pelvic exam: normal external genitalia, vulva, vagina, cervix, uterus and adnexa. Skin-no rashes or suspicious moles  Neuro normal speech, moves all extremities, normal gait  Musculoskeletal- grossly normal joint and motor function. d to person, place, and time and normal appearing weight

## 2022-06-17 NOTE — PROGRESS NOTES
Tomy Yao is a 56 y.o. female    Chief Complaint   Patient presents with    Complete Physical     Patient is coming in for a complete physical. Patient wants a pap today. Need a rederral for mammogram. Needs referral for colonscopy. Patient also requesting labs. No other concerns. 1. Have you been to the ER, urgent care clinic since your last visit? Hospitalized since your last visit? No  2. Have you seen or consulted any other health care providers outside of the [address] [place] [address] since your last visit? Include any pap smears or colon screening. No      Vitals:    06/17/22 1347   BP: 122/79   BP 1 Location: Right upper arm   BP Patient Position: Sitting   Pulse: 87   Temp: 98 °F (36.7 °C)   TempSrc: Oral   Resp: 16   Height: 5' 2\" (1.575 m)   Weight: Comment: Patient declined weight today   SpO2: 98%         Health Maintenance Due   Topic Date Due    Hepatitis C Screening  Never done    Depression Screen  Never done    Cervical cancer screen  Never done    Colorectal Cancer Screening Combo  Never done    Breast Cancer Screen Mammogram  03/09/2022    DTaP/Tdap/Td series (2 - Td or Tdap) 06/15/2022         Medication Reconciliation completed, changes noted.   Please  Update medication list.

## 2022-06-17 NOTE — PATIENT INSTRUCTIONS
Well Visit, Ages 25 to 48: Care Instructions  Overview     Well visits can help you stay healthy. Your doctor has checked your overall health and may have suggested ways to take good care of yourself. Your doctor also may have recommended tests. At home, you can help prevent illness with healthy eating, regular exercise, and other steps. Follow-up care is a key part of your treatment and safety. Be sure to make and go to all appointments, and call your doctor if you are having problems. It's also a good idea to know your test results and keep a list of the medicines you take. How can you care for yourself at home? · Get screening tests that you and your doctor decide on. Screening helps find diseases before any symptoms appear. · Eat healthy foods. Choose fruits, vegetables, whole grains, protein, and low-fat dairy foods. Limit fat, especially saturated fat. Reduce salt in your diet. · Limit alcohol. If you are a man, have no more than 2 drinks a day or 14 drinks a week. If you are a woman, have no more than 1 drink a day or 7 drinks a week. · Get at least 30 minutes of physical activity on most days of the week. Walking is a good choice. You also may want to do other activities, such as running, swimming, cycling, or playing tennis or team sports. Discuss any changes in your exercise program with your doctor. · Reach and stay at a healthy weight. This will lower your risk for many problems, such as obesity, diabetes, heart disease, and high blood pressure. · Do not smoke or allow others to smoke around you. If you need help quitting, talk to your doctor about stop-smoking programs and medicines. These can increase your chances of quitting for good. · Care for your mental health. It is easy to get weighed down by worry and stress. Learn strategies to manage stress, like deep breathing and mindfulness, and stay connected with your family and community.  If you find you often feel sad or hopeless, talk with your doctor. Treatment can help. · Talk to your doctor about whether you have any risk factors for sexually transmitted infections (STIs). You can help prevent STIs if you wait to have sex with a new partner (or partners) until you've each been tested for STIs. It also helps if you use condoms (male or female condoms) and if you limit your sex partners to one person who only has sex with you. Vaccines are available for some STIs, such as HPV. · Use birth control if it's important to you to prevent pregnancy. Talk with your doctor about the choices available and what might be best for you. · If you think you may have a problem with alcohol or drug use, talk to your doctor. This includes prescription medicines (such as amphetamines and opioids) and illegal drugs (such as cocaine and methamphetamine). Your doctor can help you figure out what type of treatment is best for you. · Protect your skin from too much sun. When you're outdoors from 10 a.m. to 4 p.m., stay in the shade or cover up with clothing and a hat with a wide brim. Wear sunglasses that block UV rays. Even when it's cloudy, put broad-spectrum sunscreen (SPF 30 or higher) on any exposed skin. · See a dentist one or two times a year for checkups and to have your teeth cleaned. · Wear a seat belt in the car. When should you call for help? Watch closely for changes in your health, and be sure to contact your doctor if you have any problems or symptoms that concern you. Where can you learn more? Go to http://sandy-bailee.info/  Enter P072 in the search box to learn more about \"Well Visit, Ages 25 to 48: Care Instructions. \"  Current as of: October 6, 2021               Content Version: 13.2  © 5409-0120 [employer]. Care instructions adapted under license by [employer] (which disclaims liability or warranty for this information).  If you have questions about a medical condition or this instruction, always ask your healthcare professional. [place] [address] any warranty or liability for your use of this information.

## 2022-06-18 LAB
ALBUMIN SERPL-MCNC: 4.1 G/DL (ref 3.5–5)
ALBUMIN/GLOB SERPL: 1.1 {RATIO} (ref 1.1–2.2)
ALP SERPL-CCNC: 81 U/L (ref 45–117)
ALT SERPL-CCNC: 21 U/L (ref 12–78)
ANION GAP SERPL CALC-SCNC: 6 MMOL/L (ref 5–15)
AST SERPL-CCNC: 13 U/L (ref 15–37)
BILIRUB SERPL-MCNC: 0.4 MG/DL (ref 0.2–1)
BUN SERPL-MCNC: 11 MG/DL (ref 6–20)
BUN/CREAT SERPL: 15 (ref 12–20)
CALCIUM SERPL-MCNC: 9.2 MG/DL (ref 8.5–10.1)
CHLORIDE SERPL-SCNC: 104 MMOL/L (ref 97–108)
CHOLEST SERPL-MCNC: 248 MG/DL
CO2 SERPL-SCNC: 27 MMOL/L (ref 21–32)
CREAT SERPL-MCNC: 0.74 MG/DL (ref 0.55–1.02)
ERYTHROCYTE [DISTWIDTH] IN BLOOD BY AUTOMATED COUNT: 13.8 % (ref 11.5–14.5)
EST. AVERAGE GLUCOSE BLD GHB EST-MCNC: 120 MG/DL
GLOBULIN SER CALC-MCNC: 3.8 G/DL (ref 2–4)
GLUCOSE SERPL-MCNC: 88 MG/DL (ref 65–100)
HBA1C MFR BLD: 5.8 % (ref 4–5.6)
HCT VFR BLD AUTO: 38.6 % (ref 35–47)
HCV AB SERPL QL IA: NONREACTIVE
HDLC SERPL-MCNC: 59 MG/DL
HDLC SERPL: 4.2 {RATIO} (ref 0–5)
HGB BLD-MCNC: 12 G/DL (ref 11.5–16)
LDLC SERPL CALC-MCNC: 162.2 MG/DL (ref 0–100)
MCH RBC QN AUTO: 26.6 PG (ref 26–34)
MCHC RBC AUTO-ENTMCNC: 31.1 G/DL (ref 30–36.5)
MCV RBC AUTO: 85.6 FL (ref 80–99)
NRBC # BLD: 0 K/UL (ref 0–0.01)
NRBC BLD-RTO: 0 PER 100 WBC
PLATELET # BLD AUTO: 288 K/UL (ref 150–400)
PMV BLD AUTO: 9.7 FL (ref 8.9–12.9)
POTASSIUM SERPL-SCNC: 4.1 MMOL/L (ref 3.5–5.1)
PROT SERPL-MCNC: 7.9 G/DL (ref 6.4–8.2)
RBC # BLD AUTO: 4.51 M/UL (ref 3.8–5.2)
SODIUM SERPL-SCNC: 137 MMOL/L (ref 136–145)
TRIGL SERPL-MCNC: 134 MG/DL (ref ?–150)
VLDLC SERPL CALC-MCNC: 26.8 MG/DL
WBC # BLD AUTO: 6 K/UL (ref 3.6–11)

## 2022-07-13 DIAGNOSIS — N63.20 MASS OF LEFT BREAST, UNSPECIFIED QUADRANT: Primary | ICD-10-CM

## 2022-08-17 ENCOUNTER — TELEPHONE (OUTPATIENT)
Dept: SLEEP MEDICINE | Age: 47
End: 2022-08-17

## 2022-08-17 NOTE — TELEPHONE ENCOUNTER
Called patient to inform her that [facility] has been trying to get in contact with her in regards to her new therapy. Gave her [facility]'s number to call back.  DAWOOD
no

## 2022-08-22 ENCOUNTER — TELEPHONE (OUTPATIENT)
Dept: FAMILY MEDICINE CLINIC | Age: 47
End: 2022-08-22

## 2022-08-22 NOTE — TELEPHONE ENCOUNTER
Patient called stating that there needs to be a new referral sent in order to get her mammogram done, because on the referral that was put in, it can't say \"Same Day\" on it because then they can't schedule it correctly. She would like this to be done soon if possible.

## 2022-08-23 DIAGNOSIS — R92.2 DENSE BREAST TISSUE ON MAMMOGRAM: Primary | ICD-10-CM

## 2022-08-29 ENCOUNTER — OFFICE VISIT (OUTPATIENT)
Dept: FAMILY MEDICINE CLINIC | Age: 47
End: 2022-08-29
Payer: OTHER GOVERNMENT

## 2022-08-29 VITALS
BODY MASS INDEX: 38.68 KG/M2 | SYSTOLIC BLOOD PRESSURE: 121 MMHG | HEART RATE: 82 BPM | DIASTOLIC BLOOD PRESSURE: 79 MMHG | TEMPERATURE: 97.5 F | RESPIRATION RATE: 18 BRPM | HEIGHT: 62 IN | OXYGEN SATURATION: 99 % | WEIGHT: 210.2 LBS

## 2022-08-29 DIAGNOSIS — M25.512 BILATERAL SHOULDER PAIN, UNSPECIFIED CHRONICITY: ICD-10-CM

## 2022-08-29 DIAGNOSIS — M25.511 BILATERAL SHOULDER PAIN, UNSPECIFIED CHRONICITY: ICD-10-CM

## 2022-08-29 DIAGNOSIS — M25.562 PAIN IN BOTH KNEES, UNSPECIFIED CHRONICITY: ICD-10-CM

## 2022-08-29 DIAGNOSIS — M25.561 PAIN IN BOTH KNEES, UNSPECIFIED CHRONICITY: ICD-10-CM

## 2022-08-29 DIAGNOSIS — M79.642 PAIN IN BOTH HANDS: ICD-10-CM

## 2022-08-29 DIAGNOSIS — M79.641 PAIN IN BOTH HANDS: ICD-10-CM

## 2022-08-29 DIAGNOSIS — M25.50 POLYARTHRALGIA: Primary | ICD-10-CM

## 2022-08-29 PROCEDURE — 99214 OFFICE O/P EST MOD 30 MIN: CPT | Performed by: FAMILY MEDICINE

## 2022-08-29 NOTE — PROGRESS NOTES
[id number] [facility]      Chief Complaint:   Chief Complaint   Patient presents with    Hand Pain     Multiple joint pain - worse in hands. Hand pain x 5 years. States she has pain, weakness, and numbness (in left hand)       HPI:  Bacilio Paz is a 56 y.o. female who presents with a 10+-year history of multiple joint pain, located in her hips, knees, shoulders, wrists, and hands. She has had rheumatology work-up in the past, which was unremarkable per her report. She first noticed her hand pain 7-8 years ago with no prior injury or trauma. Currently, she reports constant pain over the R thenar eminence as well as an \"itching\" sensation and decreased sensation over the L thenar eminence, occupied by intermittent swelling of the finger joints. This numbness appears to increase at night. She also reports decreased  strength. She denies any cervical spine issues, or radicular pain, numbness/tingling in the arms. 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 Meds:    Current Outpatient Medications:     Cetirizine (ZyrTEC) 10 mg cap, Take 20 mg by mouth daily. , Disp: 60 Capsule, Rfl: 2    fluticasone propionate (FLONASE) 50 mcg/actuation nasal spray, 2 Sprays by Both Nostrils route daily. , Disp: , Rfl:     montelukast (SINGULAIR) 10 mg tablet, Take 1 Tablet by mouth daily.  (Patient not taking: No sig reported), Disp: 90 Tablet, Rfl: 1   Allergies:  No Known Allergies  Smoker:   Social History     Tobacco Use   Smoking Status Never   Smokeless Tobacco Never     ETOH:   Social History     Substance and Sexual Activity   Alcohol Use Yes    Alcohol/week: 2.0 standard drinks    Types: 2 Standard drinks or equivalent per week       FH:    Family History   Problem Relation Age of Onset    Hypertension Mother     Elevated Lipids Mother Diabetes Mother     OSTEOARTHRITIS Mother     Cancer Mother     Lung Disease Mother     Breast Cancer Mother         age 76    Colon Cancer Father     Heart Disease Father     Hypertension Father     Elevated Lipids Father     Cancer Father     Lung Disease Father     Breast Cancer Paternal Aunt 79     ROS:  General/Constitutional:  No headache, fever, fatigue, weight loss or weight gain     Eyes:  No redness, pruritis, pain, visual changes, swelling, or discharge    Ears:  No pain, loss or changes in hearin    Neck:  No swelling, masses, stiffness, pain, or limited movemen    Cardiac:   No chest pain    Respiratory:  No cough or shortness of breath    GI:  No nausea/vomiting, diarrhea, abdominal pain, bloody or dark stools     :  No dysuria or  hematuria   Neurological:  No loss of consciousness, dizziness, seizures, dysarthria, cognitive changes, memory changes,  problems with balance, or unilateral weakness    Skin: No rash     Physical Exam:  Gen: NAD. Responds to all questions appropriately. Lungs: No labored respirations. Skin: No obvious rash  Ext: Well perfused. No edema.   MSK - Hip bilateral:    Deformity: None    ROM:     Flexion: Normal    Extension: Normal     Internal/external rotation: Normal      Gait: Normal       Palpation:    L1-L5: No tenderness    Sacrum: No tenderness    Coccyx: No tenderness    Left Paraspinal: No tenderness    Right Paraspinal: No tenderness    Greater trochanter: No tenderness    Ischial Tuberosity: No tenderness    Piriformis: No tenderness       Strength (0-5/5)    Hip Flexion:   Left: 5/5  Right: 5/5    Hip Extension:  Left: 5/5  Right: 5/5    Hip Abduction:  Left: 5/5  Right: 5/5    Hip Adduction:  Left: 5/5  Right: 5/5    Knee Extension:  Left: 5/5  Right: 5/5    Knee Flexion:   Left: 5/5  Right: 5/5     Sensation: Intact, no deficits     MSK:    Posture: Normal   Deformity: None    ROM - Cervical spine:     Flexion: Normal     Extension: Normal     Lateral bending: Normal    Upper Ext: FROM bilaterally       Palpation:    C1 - T1 tenderness: None    Trapezious tenderness:      Strength (0-5/5):    :   Left: 5/5  Right: 5/5    Wrist Extension:  Left: 5/5  Right: 5/5    Wrist Flexion:  Left: 5/5  Right: 5/5    Elbow Flextion: Left: 5/5  Right: 5/5    Elbow Extension:  Left: 5/5  Right: 5/5    Shoulder Flexion:  Left: 5/5  Right: 5/5    Shoulder Abduction:  Left: 5/5  Right: 5/5    Shouder Ext Rotation: Left: 5/5  Right: 5/5    Shoulder Int Rotation: Left: 5/5  Right: 5/5       Sensation: Intact, no deficits in the upper ext bilaterally. Wrist: bilaterally  Wrist Effusion: None  Deformity: None    ROM:  Flexion: Normal   Extension: Normal    Other test:  Phalens test: Negative  Tinels test: Negative    Strength (0-5/5):   Flexion:5/5  Extension: 5/5  : 5/5  Intrinsics: 5/5  EPL (extensor pollicis longus): 5/5  Pinch mechanism: 5/5    Visit Vitals  /79 (BP 1 Location: Left upper arm, BP Patient Position: Sitting, BP Cuff Size: Large adult)   Pulse 82   Temp 97.5 °F (36.4 °C) (Temporal)   Resp 18   Ht 5' 2\" (1.575 m)   Wt 210 lb 3.2 oz (95.3 kg)   SpO2 99%   BMI 38.45 kg/m²     Assessment:  Shlomo Arriola is a 56 y.o. female who presents with a long history of polyarthralgias with history of negative rheumatology work-up. However, given her diffuse joint pain, a rheumatological etiology appears to be most likely. Plan:  Obtain rheumatology labs, including CBC, ESR, CRP, and RF, for evaluation of rheumatological etiology for polyarthralgias. Discussed importance of warming up to increased physical activity and role of exercise in addressing diffuse joint pain. Provided patient with home exercise plan. *ATTENTION:  This note has been created by a medical student for educational purposes only. Please do not refer to the content of this note for clinical decision-making, billing, or other purposes.   Please see attending physicians note to obtain clinical information on this patient. *

## 2022-08-29 NOTE — PROGRESS NOTES
Patricia Ortiz is a 56 y.o. female    Chief Complaint   Patient presents with    Hand Pain     Multiple joint pain - worse in hands. Hand pain x 5 years. States she has pain, weakness, and numbness (in left hand)       Visit Vitals  /79 (BP 1 Location: Left upper arm, BP Patient Position: Sitting, BP Cuff Size: Large adult)   Pulse 82   Temp 97.5 °F (36.4 °C) (Temporal)   Resp 18   Ht 5' 2\" (1.575 m)   Wt 210 lb 3.2 oz (95.3 kg)   SpO2 99%   BMI 38.45 kg/m²       1. \"Have you been to the ER, urgent care clinic since your last visit? Hospitalized since your last visit? \" No    2. \"Have you seen or consulted any other health care providers outside of the [address] [place] [address] since your last visit? \" No     3. For patients aged 39-70: Has the patient had a colonoscopy / FIT/ Cologuard? No      If the patient is female:    4. For patients aged 41-77: Has the patient had a mammogram within the past 2 years? Yes - Care Gap present. Most recent result on file      5. For patients aged 21-65: Has the patient had a pap smear? Yes - no Care Gap present      Health Maintenance Due   Topic Date Due    Colorectal Cancer Screening Combo  Never done    Breast Cancer Screen Mammogram  03/09/2022    DTaP/Tdap/Td series (2 - Td or Tdap) 06/15/2022         Medication Reconciliation completed, changes noted.   Please update medication list.

## 2022-08-30 LAB
CRP SERPL-MCNC: 0.49 MG/DL (ref 0–0.6)
ERYTHROCYTE [DISTWIDTH] IN BLOOD BY AUTOMATED COUNT: 13.8 % (ref 11.5–14.5)
ERYTHROCYTE [SEDIMENTATION RATE] IN BLOOD: 22 MM/HR (ref 0–20)
HCT VFR BLD AUTO: 38.6 % (ref 35–47)
HGB BLD-MCNC: 12 G/DL (ref 11.5–16)
MCH RBC QN AUTO: 27 PG (ref 26–34)
MCHC RBC AUTO-ENTMCNC: 31.1 G/DL (ref 30–36.5)
MCV RBC AUTO: 86.7 FL (ref 80–99)
NRBC # BLD: 0 K/UL (ref 0–0.01)
NRBC BLD-RTO: 0 PER 100 WBC
PLATELET # BLD AUTO: 317 K/UL (ref 150–400)
PMV BLD AUTO: 9.9 FL (ref 8.9–12.9)
RBC # BLD AUTO: 4.45 M/UL (ref 3.8–5.2)
RHEUMATOID FACT SERPL-ACNC: <10 IU/ML (ref 0–15)
WBC # BLD AUTO: 6 K/UL (ref 3.6–11)

## 2022-08-30 NOTE — PROGRESS NOTES
[id number] [facility]      Chief Complaint:   Chief Complaint   Patient presents with    Hand Pain     Multiple joint pain - worse in hands. Hand pain x 5 years. States she has pain, weakness, and numbness (in left hand)       HPI:  Latasha Borrego is a 56 y.o. female who presents with a 10+-year history of multiple joint pain, located in her hips, knees, shoulders, wrists, and hands. She has had rheumatology work-up about 10 years ago, which was unremarkable per her report. She first noticed her hand pain 7-8 years ago with no prior injury or trauma. Currently, she reports constant pain over the R thenar eminence as well as an \"itching\" sensation and decreased sensation over the L thenar eminence, occupied by intermittent swelling of the finger joints. This numbness appears to increase at night. She also reports decreased  strength. She denies any cervical spine pain, or radicular pain and no numbness/tingling in the arms. 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 She continues to have intermittent shoulder, hip, and knee pain as well. Meds:    Current Outpatient Medications:     Cetirizine (ZyrTEC) 10 mg cap, Take 20 mg by mouth daily. , Disp: 60 Capsule, Rfl: 2    fluticasone propionate (FLONASE) 50 mcg/actuation nasal spray, 2 Sprays by Both Nostrils route daily. , Disp: , Rfl:     montelukast (SINGULAIR) 10 mg tablet, Take 1 Tablet by mouth daily.  (Patient not taking: No sig reported), Disp: 90 Tablet, Rfl: 1   Allergies:  No Known Allergies  Smoker:   Social History     Tobacco Use   Smoking Status Never   Smokeless Tobacco Never     ETOH:   Social History     Substance and Sexual Activity   Alcohol Use Yes    Alcohol/week: 2.0 standard drinks    Types: 2 Standard drinks or equivalent per week       FH:    Family History   Problem Relation Age of Onset    Hypertension Mother     Elevated Lipids Mother     Diabetes Mother     OSTEOARTHRITIS Mother     Cancer Mother     Lung Disease Mother     Breast Cancer Mother         age 76    Colon Cancer Father     Heart Disease Father     Hypertension Father     Elevated Lipids Father     Cancer Father     Lung Disease Father     Breast Cancer Paternal Aunt 79     ROS: Per HPI    Physical Exam:  Visit Vitals  /79 (BP 1 Location: Left upper arm, BP Patient Position: Sitting, BP Cuff Size: Large adult)   Pulse 82   Temp 97.5 °F (36.4 °C) (Temporal)   Resp 18   Ht 5' 2\" (1.575 m)   Wt 210 lb 3.2 oz (95.3 kg)   SpO2 99%   BMI 38.45 kg/m²       Gen: NAD. Responds to all questions appropriately. Lungs: No labored respirations. Skin: No obvious rash  Ext: Well perfused. No edema.   MSK - Hip bilateral:    Deformity: None    ROM:     Flexion: Normal    Extension: Normal     Internal/external rotation: Normal      Gait: Normal       Palpation:    L1-L5: No tenderness    Sacrum: No tenderness    Coccyx: No tenderness    Left Paraspinal: No tenderness    Right Paraspinal: No tenderness    Greater trochanter: No tenderness    Ischial Tuberosity: No tenderness    Piriformis: No tenderness       Strength (0-5/5)    Hip Flexion:   Left: 5/5  Right: 5/5    Hip Extension:  Left: 5/5  Right: 5/5    Hip Abduction:  Left: 5/5  Right: 5/5    Hip Adduction:  Left: 5/5  Right: 5/5    Knee Extension:  Left: 5/5  Right: 5/5    Knee Flexion:   Left: 5/5  Right: 5/5     Sensation: Intact, no deficits     MSK:    Posture: Normal   Deformity: None    ROM - Cervical spine:     Flexion: Normal     Extension: Normal     Lateral bending: Normal    Upper Ext: FROM bilaterally       Palpation:    C1 - T1 tenderness: None    Trapezious tenderness:      Strength (0-5/5):    :   Left: 5/5  Right: 5/5    Wrist Extension:  Left: 5/5  Right: 5/5    Wrist Flexion:  Left: 5/5  Right: 5/5    Elbow Flextion: Left: 5/5  Right: 5/5    Elbow Extension:  Left: 5/5  Right: 5/5    Shoulder Flexion:  Left: 5/5  Right: 5/5    Shoulder Abduction:  Left: 5/5  Right: 5/5    Shouder Ext Rotation: Left: 5/5  Right: 5/5    Shoulder Int Rotation: Left: 5/5  Right: 5/5       Sensation: Intact, no deficits in the upper ext bilaterally. Wrist: bilaterally  Wrist Effusion: None  Deformity: None    ROM:  Flexion: Normal   Extension: Normal    Other test:  Phalens test: Negative  Tinels test: Negative    Strength (0-5/5):   Flexion:5/5  Extension: 5/5  : 5/5  Intrinsics: 5/5  EPL (extensor pollicis longus): 5/5  Pinch mechanism: 5/5    Assessment:  Leanne Antonio is a 56 y.o. female who presents with a long history of polyarthralgias. She has a negative Rheum work up about 10 years ago however, given her diffuse joint pain and ongoing sx will recheck labs. Plan:  Obtain rheumatology labs, including CBC, ESR, CRP, and RF, for evaluation of rheumatological etiology for polyarthralgias. Discussed importance of warming up to increased physical activity and role of exercise in addressing diffuse joint pain. Provided patient with home exercise plan.

## 2022-08-31 LAB — ANA SER QL: NEGATIVE

## 2022-09-26 ENCOUNTER — TRANSCRIBE ORDER (OUTPATIENT)
Dept: MAMMOGRAPHY | Age: 47
End: 2022-09-26

## 2022-09-26 DIAGNOSIS — N63.20 LEFT BREAST MASS: Primary | ICD-10-CM

## 2022-09-28 NOTE — PROGRESS NOTES
[address] [facility] [place]. [place], [address]   Tel.  [phone]   Fax. [address] [facility] [address]   [place], [address] [facility]   Tel.  [phone]   Fax. [phone] [address]   Tel.  [phone]   Fax. [phone]     Melina Beasley (: 1975) is a 56 y.o. female, established patient, seen for positive airway pressure follow-up and evaluation. She was last seen by Dr. Yolie James on 2021, prior notes reviewed in detail. In lab sleep test 2022 showed AHI of 10.5/hr, with a subsequent titration study showing events responding to CPAP therapy. She is seen today for follow up. ASSESSMENT/PLAN:    ICD-10-CM ICD-9-CM    1. KEYONNA (obstructive sleep apnea)  G47.33 327.23 AMB SUPPLY ORDER      2. BMI 38.0-38.9,adult  Z68.38 V85.38         AHI = 10.5 (). On APAP :  5-12 cmH2O. Set up . She is adherent with PAP therapy and PAP continues to benefit patient and remains necessary for control of her sleep apnea. Sleep Apnea - Overall she reports doing better using her machine. Pressure settings changed to APAP 5-15 cmH2O. Remote download in 2 weeks. Orders Placed This Encounter    AMB SUPPLY ORDER     Diagnosis: (G47.33) KEYONNA (obstructive sleep apnea)  (primary encounter diagnosis)     Replacement Supplies for Positive Airway Pressure Therapy Device:   Duration of need: 99 months.  Full Face Mask 1 every 3 months.  Full Face Mask Cushion 1 per month.  Headgear 1 every 6 months.  Positive Airway Pressure chinstrap 1 every 6 months.  Tubing with heating element 1 every 3 months.  Filter(s) Disposable 2 per month.  Filter(s) Non-Disposable 1 every 6 months. .   [address] [facility] for Humidifier (Replace) 1 every 6 months. GINGER [employer] NPI: [id number]    Electronically signed.  Date:- 22     * Counseling was provided regarding the importance of regular PAP use with emphasis on ensuring sufficient total sleep time, proper sleep hygiene, and safe driving. * Re-enforced proper and regular cleaning for the device. * She was asked to contact our office for any problems regarding PAP therapy. 2. Recommended a dedicated weight loss program through appropriate diet and exercise regimen as significant weight reduction has been shown to reduce severity of obstructive sleep apnea. SUBJECTIVE/OBJECTIVE:    She  is seen today for follow up on PAP device and reports no problems using the device. The following concerns identified:    Drowsiness no Problems exhaling no   Snoring no Forget to put on no   Mask Comfortable yes Can't fall asleep no   Dry Mouth no Mask falls off no   Air Leaking no Frequent awakenings no     She admits that her sleep has improved on PAP therapy using full face mask and heated tubing. Review of device download indicated:  Auto pressure: 5-12 cmH2O; Max Pressure: 12.0 cmH2O;  95th percentile Pressure: 12.0 cmH2O   % Used Days >= 4 hours: 80. Avg hours used:  5 hours 22 minutes. Therapy Apnea Index averaged over PAP use: 4.4 /hr which reflects improved sleep breathing condition. [place]phur Sleepiness Score: 8 and Modified F.O.S.Q. Score Total / 2: 19 which reflects improved sleep quality over therapy time. Sleep Review of Systems: notable for Negative difficulty falling asleep; Negative awakenings at night; Negative early morning headaches; Negative memory problems; Negative concentration issues;  Negative chest pain; Negative shortness of breath; Negative significant joint pain at night; Negative significant muscle pain at night; Negative rashes or itching; Negative heartburn; Negative significant mood issues    Visit Vitals  /78 (BP 1 Location: Left upper arm, BP Patient Position: Sitting)   Pulse 92   Ht 5' 2\" (1.575 m)   Wt 206 lb 1.6 oz (93.5 kg)   SpO2 100%   BMI 37.70 kg/m²          General:   Alert, oriented, not in acute distress   Eyes:  Anicteric Sclerae; no obvious strabismus   Nose:  No obvious nasal septum deviation    Neck:   Midline trachea   Chest/Lungs:  Symmetrical lung expansion, clear lung fields on auscultation    CVS:  Normal rate, regular rhythm,  no JVD   Extremities:  No obvious rashes, no edema    Neuro:  No focal deficits; No obvious tremor    Psych:  Normal affect,  normal countenance     Patient's phone number [phone] (home)  was reviewed and confirmed for accuracy. She gives permission for messages regarding results and appointments to be left at that number. On this date 09/29/2022 I have spent 20 minutes reviewing previous notes, test results and face to face with the patient discussing the diagnosis and importance of compliance with the treatment plan as well as documenting on the day of the visit. An electronic signature was used to authenticate this note.     -- Frandy Rose NP, [facility]  09/29/22

## 2022-09-29 ENCOUNTER — DOCUMENTATION ONLY (OUTPATIENT)
Dept: SLEEP MEDICINE | Age: 47
End: 2022-09-29

## 2022-09-29 ENCOUNTER — OFFICE VISIT (OUTPATIENT)
Dept: SLEEP MEDICINE | Age: 47
End: 2022-09-29
Payer: OTHER GOVERNMENT

## 2022-09-29 ENCOUNTER — HOSPITAL ENCOUNTER (OUTPATIENT)
Dept: MAMMOGRAPHY | Age: 47
Discharge: HOME | End: 2022-09-29
Attending: STUDENT IN AN ORGANIZED HEALTH CARE EDUCATION/TRAINING PROGRAM
Payer: OTHER GOVERNMENT

## 2022-09-29 VITALS
SYSTOLIC BLOOD PRESSURE: 119 MMHG | HEIGHT: 62 IN | DIASTOLIC BLOOD PRESSURE: 78 MMHG | HEART RATE: 92 BPM | WEIGHT: 206.1 LBS | OXYGEN SATURATION: 100 % | BODY MASS INDEX: 37.93 KG/M2

## 2022-09-29 DIAGNOSIS — N63.20 MASS OF BREAST, LEFT: ICD-10-CM

## 2022-09-29 DIAGNOSIS — N63.20 LEFT BREAST MASS: ICD-10-CM

## 2022-09-29 DIAGNOSIS — Z12.31 VISIT FOR SCREENING MAMMOGRAM: ICD-10-CM

## 2022-09-29 DIAGNOSIS — G47.33 OSA (OBSTRUCTIVE SLEEP APNEA): Primary | ICD-10-CM

## 2022-09-29 PROCEDURE — 77063 BREAST TOMOSYNTHESIS BI: CPT

## 2022-09-29 PROCEDURE — 99213 OFFICE O/P EST LOW 20 MIN: CPT | Performed by: NURSE PRACTITIONER

## 2022-09-29 NOTE — PATIENT INSTRUCTIONS
[address] [facility]., [place]. [place], [address]  Tel.  [phone]  Fax. 100 [facility] 60  [place], [address] [facility]  Tel.  [phone]  Fax. [phone] [address]  Tel.  [phone]  Fax. [phone]     Learning About CPAP for Sleep Apnea  What is CPAP? CPAP is a small machine that you use at home every night while you sleep. It increases air pressure in your throat to keep your airway open. When you have sleep apnea, this can help you sleep better so you feel much better. CPAP stands for \"continuous positive airway pressure. \"  The CPAP machine will have one of the following:  A mask that covers your nose and mouth  Prongs that fit into your nose  A mask that covers your nose only, the most common type. This type is called NCPAP. The N stands for \"nasal.\"  Why is it done? CPAP is usually the best treatment for obstructive sleep apnea. It is the first treatment choice and the most widely used. Your doctor may suggest CPAP if you have: Moderate to severe sleep apnea. Sleep apnea and coronary artery disease (CAD) or heart failure. How does it help? CPAP can help you have more normal sleep, so you feel less sleepy and more alert during the daytime. CPAP may help keep heart failure or other heart problems from getting worse. NCPAP may help lower your blood pressure. If you use CPAP, your bed partner may also sleep better because you are not snoring or restless. What are the side effects? Some people who use CPAP have:  A dry or stuffy nose and a sore throat. Irritated skin on the face. Sore eyes. Bloating. If you have any of these problems, work with your doctor to fix them. Here are some things you can try:  Be sure the mask or nasal prongs fit well. See if your doctor can adjust the pressure of your CPAP. If your nose is dry, try a humidifier.   If your nose is runny or stuffy, try decongestant medicine or a steroid nasal spray. If these things do not help, you might try a different type of machine. Some machines have air pressure that adjusts on its own. Others have air pressures that are different when you breathe in than when you breathe out. This may reduce discomfort caused by too much pressure in your nose. Where can you learn more? Go to Optireno.be  Enter [place] in the search box to learn more about \"Learning About CPAP for Sleep Apnea. \"   © 1704-4702 Healthwise, Incorporated. Care instructions adapted under license by [facility] (which disclaims liability or warranty for this information). This care instruction is for use with your licensed healthcare professional. If you have questions about a medical condition or this instruction, always ask your healthcare professional. [address] any warranty or liability for your use of this information. Content Version: 4.2.80528; Last Revised: January 11, 2010  PROPER SLEEP HYGIENE    What to avoid  Do not have drinks with caffeine, such as coffee or black tea, for 8 hours before bed. Do not smoke or use other types of tobacco near bedtime. Nicotine is a stimulant and can keep you awake. Avoid drinking alcohol late in the evening, because it can cause you to wake in the middle of the night. Do not eat a big meal close to bedtime. If you are hungry, eat a light snack. Do not drink a lot of water close to bedtime, because the need to urinate may wake you up during the night. Do not read or watch TV in bed. Use the bed only for sleeping and sexual activity. What to try  Go to bed at the same time every night, and wake up at the same time every morning. Do not take naps during the day. Keep your bedroom quiet, dark, and cool. Get regular exercise, but not within 3 to 4 hours of your bedtime. .  Sleep on a comfortable pillow and mattress.   If watching the clock makes you anxious, turn it facing away from you so you cannot see the time. If you worry when you lie down, start a worry book. Well before bedtime, write down your worries, and then set the book and your concerns aside. Try meditation or other relaxation techniques before you go to bed. If you cannot fall asleep, get up and go to another room until you feel sleepy. Do something relaxing. Repeat your bedtime routine before you go to bed again. Make your house quiet and calm about an hour before bedtime. Turn down the lights, turn off the TV, log off the computer, and turn down the volume on music. This can help you relax after a busy day. Drowsy Driving: The [place] [address] cites drowsiness as a causing factor in more than 211,832 police reported crashes annually, resulting in 76,000 injuries and 1,500 deaths. Other surveys suggest 55% of people polled have driven while drowsy in the past year, 23% had fallen asleep but not crashed, 3% crashed, and 2% had and accident due to drowsy driving. Who is at risk? Young Drivers: One study of drowsy driving accidents states that 55% of the drivers were under 25 years. Of those, 75% were male. Shift Workers and Travelers: People who work overnight or travel across time zones frequently are at higher risk of experiencing Circadian Rhythm Disorders. They are trying to work and function when their body is programed to sleep. Sleep Deprived: Lack of sleep has a serious impact on your ability to pay attention or focus on a task. Consistently getting less than the average of 8 hours your body needs creates partial or cumulative sleep deprivation. Untreated Sleep Disorders: Sleep Apnea, Narcolepsy, R.L.S., and other sleep disorders (untreated) prevent a person from getting enough restful sleep. This leads to excessive daytime sleepiness and increases the risk for drowsy driving accidents by up to 7 times.   Medications / Alcohol: Even over the counter medications can cause drowsiness. Medications that impair a drivers attention should have a warning label. Alcohol naturally makes you sleepy and on its own can cause accidents. Combined with excessive drowsiness its effects are amplified. Signs of Drowsy Driving:   * You don't remember driving the last few miles   * You may drift out of your humera   * You are unable to focus and your thoughts wander   * You may yawn more often than normal   * You have difficulty keeping your eyes open / nodding off   * Missing traffic signs, speeding, or tailgating  Prevention-   Good sleep hygiene, lifestyle and behavioral choices have the most impact on drowsy driving. There is no substitute for sleep and the average person requires 8 hours nightly. If you find yourself driving drowsy, stop and sleep. Consider the sleep hygiene tips provided during your visit as well. Medication Refill Policy: Refills for all medications require 1 week advance notice. Please have your pharmacy fax a refill request. We are unable to fax, or call in \"controled substance\" medications and you will need to pick these prescriptions up from our office. Flazio Activation    Thank you for requesting access to Flazio. Please follow the instructions below to securely access and download your online medical record. Flazio allows you to send messages to your doctor, view your test results, renew your prescriptions, schedule appointments, and more. How Do I Sign Up? In your internet browser, go to [email or web address]. Click on the First Time User? Click Here link in the Sign In box. You will see the New Member Sign Up page. Enter your Flazio Access Code exactly as it appears below. You will not need to use this code after youve completed the sign-up process. If you do not sign up before the expiration date, you must request a new code. Flazio Access Code:  Activation code not generated  Current Flazio Status: Active (This is the date your Emgo access code will )    Enter the last four digits of your Social Security Number (xxxx) and Date of Birth (mm/dd/yyyy) as indicated and click Submit. You will be taken to the next sign-up page. Create a Emgo ID. This will be your Emgo login ID and cannot be changed, so think of one that is secure and easy to remember. Create a Emgo password. You can change your password at any time. Enter your Password Reset Question and Answer. This can be used at a later time if you forget your password. Enter your e-mail address. You will receive e-mail notification when new information is available in [address] Click Sign Up. You can now view and download portions of your medical record. Click the [employer] link to download a portable copy of your medical information. Additional Information    If you have questions, please call [phone]. Remember, Emgo is NOT to be used for urgent needs. For medical emergencies, dial 911.

## 2022-09-30 NOTE — PROGRESS NOTES
Mammogram with no evidence of malignancy. Next screening mammogram is recommended in one year.      Severo Culver MD

## 2022-10-13 ENCOUNTER — OFFICE VISIT (OUTPATIENT)
Dept: SLEEP MEDICINE | Age: 47
End: 2022-10-13

## 2022-10-13 DIAGNOSIS — G47.33 OSA ON CPAP: Primary | ICD-10-CM

## 2022-10-13 DIAGNOSIS — Z99.89 OSA ON CPAP: Primary | ICD-10-CM

## 2023-05-18 ENCOUNTER — ANESTHESIA (OUTPATIENT)
Facility: HOSPITAL | Age: 48
End: 2023-05-18
Payer: OTHER GOVERNMENT

## 2023-05-18 ENCOUNTER — ANESTHESIA EVENT (OUTPATIENT)
Facility: HOSPITAL | Age: 48
End: 2023-05-18
Payer: OTHER GOVERNMENT

## 2023-05-18 ENCOUNTER — HOSPITAL ENCOUNTER (OUTPATIENT)
Facility: HOSPITAL | Age: 48
Setting detail: OUTPATIENT SURGERY
Discharge: HOME | End: 2023-05-18
Attending: INTERNAL MEDICINE | Admitting: INTERNAL MEDICINE
Payer: OTHER GOVERNMENT

## 2023-05-18 VITALS
RESPIRATION RATE: 12 BRPM | HEIGHT: 62 IN | OXYGEN SATURATION: 100 % | WEIGHT: 193.56 LBS | HEART RATE: 94 BPM | DIASTOLIC BLOOD PRESSURE: 80 MMHG | SYSTOLIC BLOOD PRESSURE: 127 MMHG | TEMPERATURE: 98 F | BODY MASS INDEX: 35.62 KG/M2

## 2023-05-18 PROCEDURE — 3600007502: Performed by: INTERNAL MEDICINE

## 2023-05-18 PROCEDURE — 6360000002 HC RX W HCPCS: Performed by: NURSE ANESTHETIST, CERTIFIED REGISTERED

## 2023-05-18 PROCEDURE — 3700000000 HC ANESTHESIA ATTENDED CARE: Performed by: INTERNAL MEDICINE

## 2023-05-18 PROCEDURE — 7100000011 HC PHASE II RECOVERY - ADDTL 15 MIN: Performed by: INTERNAL MEDICINE

## 2023-05-18 PROCEDURE — 3600007512: Performed by: INTERNAL MEDICINE

## 2023-05-18 PROCEDURE — 3700000001 HC ADD 15 MINUTES (ANESTHESIA): Performed by: INTERNAL MEDICINE

## 2023-05-18 PROCEDURE — 2580000003 HC RX 258: Performed by: INTERNAL MEDICINE

## 2023-05-18 PROCEDURE — 7100000010 HC PHASE II RECOVERY - FIRST 15 MIN: Performed by: INTERNAL MEDICINE

## 2023-05-18 PROCEDURE — 88305 TISSUE EXAM BY PATHOLOGIST: CPT

## 2023-05-18 PROCEDURE — 2709999900 HC NON-CHARGEABLE SUPPLY: Performed by: INTERNAL MEDICINE

## 2023-05-18 PROCEDURE — 2500000003 HC RX 250 WO HCPCS: Performed by: NURSE ANESTHETIST, CERTIFIED REGISTERED

## 2023-05-18 PROCEDURE — 2720000010 HC SURG SUPPLY STERILE: Performed by: INTERNAL MEDICINE

## 2023-05-18 RX ORDER — IBUPROFEN 200 MG
200 TABLET ORAL EVERY 6 HOURS PRN
COMMUNITY

## 2023-05-18 RX ORDER — LIDOCAINE HYDROCHLORIDE 20 MG/ML
INJECTION, SOLUTION INTRAVENOUS PRN
Status: DISCONTINUED | OUTPATIENT
Start: 2023-05-18 | End: 2023-05-18 | Stop reason: SDUPTHER

## 2023-05-18 RX ORDER — SODIUM CHLORIDE 9 MG/ML
INJECTION, SOLUTION INTRAVENOUS CONTINUOUS
Status: DISCONTINUED | OUTPATIENT
Start: 2023-05-18 | End: 2023-05-18 | Stop reason: HOSPADM

## 2023-05-18 RX ORDER — AMOXICILLIN 500 MG/1
500 CAPSULE ORAL 3 TIMES DAILY
COMMUNITY

## 2023-05-18 RX ORDER — EPHEDRINE SULFATE/0.9% NACL/PF 50 MG/5 ML
SYRINGE (ML) INTRAVENOUS PRN
Status: DISCONTINUED | OUTPATIENT
Start: 2023-05-18 | End: 2023-05-18 | Stop reason: SDUPTHER

## 2023-05-18 RX ORDER — CETIRIZINE HYDROCHLORIDE 10 MG/1
10 TABLET ORAL DAILY
COMMUNITY

## 2023-05-18 RX ORDER — PROPOFOL 10 MG/ML
INJECTION, EMULSION INTRAVENOUS CONTINUOUS PRN
Status: DISCONTINUED | OUTPATIENT
Start: 2023-05-18 | End: 2023-05-18 | Stop reason: SDUPTHER

## 2023-05-18 RX ADMIN — PROPOFOL 50 MG: 10 INJECTION, EMULSION INTRAVENOUS at 10:57

## 2023-05-18 RX ADMIN — SODIUM CHLORIDE: 9 INJECTION, SOLUTION INTRAVENOUS at 10:54

## 2023-05-18 RX ADMIN — LIDOCAINE HYDROCHLORIDE 100 MG: 20 INJECTION, SOLUTION INTRAVENOUS at 10:56

## 2023-05-18 RX ADMIN — PROPOFOL 150 MCG/KG/MIN: 10 INJECTION, EMULSION INTRAVENOUS at 10:56

## 2023-05-18 RX ADMIN — Medication 20 MG: at 11:23

## 2023-05-18 RX ADMIN — Medication 10 MG: at 11:10

## 2023-05-18 ASSESSMENT — PAIN - FUNCTIONAL ASSESSMENT: PAIN_FUNCTIONAL_ASSESSMENT: 0-10

## 2023-05-18 NOTE — ANESTHESIA PRE PROCEDURE
Department of Anesthesiology  Preprocedure Note       Name:  Anna Zuniga   Age:  56 y.o.  :  1975                                          MRN:  [id number]         Date:  2023      Surgeon: Keshawn Tamayo):  Cesario Way MD    Procedure: Procedure(s):  COLONOSCOPY    Medications prior to admission:   Prior to Admission medications    Medication Sig Start Date End Date Taking? Authorizing Provider   cetirizine (ZYRTEC) 10 MG tablet Take 1 tablet by mouth daily   Yes Historical Provider, MD   amoxicillin (AMOXIL) 500 MG capsule Take 1 capsule by mouth 3 times daily   Yes Historical Provider, MD   ibuprofen (ADVIL;MOTRIN) 200 MG tablet Take 1 tablet by mouth every 6 hours as needed for Pain   Yes Historical Provider, MD       Current medications:    Current Facility-Administered Medications   Medication Dose Route Frequency Provider Last Rate Last Admin    0.9 % sodium chloride infusion   IntraVENous Continuous Cesario Way MD           Allergies:  No Known Allergies    Problem List:  There is no problem list on file for this patient. Past Medical History:        Diagnosis Date    Anemia     Fibromyalgia     HLD (hyperlipidemia)     Obesity (BMI 30-39. 9)     LA (obstructive sleep apnea)     PCOS (polycystic ovarian syndrome)     Prediabetes     Sleep apnea 2022    uses c-pap       Past Surgical History:        Procedure Laterality Date     SECTION         Social History:    Social History     Tobacco Use    Smoking status: Never    Smokeless tobacco: Never   Substance Use Topics    Alcohol use: Yes     Alcohol/week: 2.0 standard drinks                                Counseling given: Not Answered      Vital Signs (Current): There were no vitals filed for this visit.                                            BP Readings from Last 3 Encounters:   22 119/78   22 121/79   22 122/79       NPO Status: Time of last liquid consumption: 0300                        Time of

## 2023-05-18 NOTE — DISCHARGE INSTRUCTIONS
[facility]. Lorene Marinelli M.D.  [phone]            COLON DISCHARGE INSTRUCTIONS       2023    Janie Mo  :  1975  [place] Medical Record Number:  [id number]      COLONOSCOPY FINDINGS:  Your colonoscopy showed two polyps that were removed and sent to pathology. DISCOMFORT:  Redness at IV site- apply warm compress to area; if redness or soreness persist- contact your physician  There may be a slight amount of blood passed from the rectum  Gaseous discomfort- walking, belching will help relieve any discomfort  You may not operate a vehicle for 12 hours  You may not engage in an occupation involving machinery or appliances for rest of today  You may not drink alcoholic beverages for at least 12 hours  Avoid making any critical decisions for at least 24 hour  DIET:   High fiber diet   - however -  remember your colon is empty and a heavy meal will produce gas. Avoid these foods:  vegetables, fried / greasy foods, carbonated drinks for today     ACTIVITY:  You may resume your normal daily activities it is recommended that you spend the remainder of the day resting -  avoid any strenuous activity. CALL M.D. ANY SIGN OF:   Increasing pain, nausea, vomiting  Abdominal distension (swelling)  New increased bleeding (oral or rectal)  Fever (chills)  Pain in chest area  Bloody discharge from nose or mouth   Shortness of breath    Follow-up Instructions:   Call Dr. Berman Show if any questions or problems. Telephone # [phone]  Biopsy results will be available in  5 to 7 days  Should have a repeat colonoscopy in 3 years.

## 2023-05-18 NOTE — PERIOP NOTE
Endoscope was pre-cleaned at bedside immediately following procedure by [facility].

## 2023-05-18 NOTE — H&P
Mc Freedman M.D.  [phone]    History and Physical       NAME:  Harley Kirkland   :   1975   MRN:   [id number]       Referring Physician:  Dr. Carolyn Couch Date: 2023 10:46 AM    Chief Complaint:  Colon cancer screening    History of Present Illness:  Patient is a 56 y.o. who is seen for colon cancer screening. Denies any ongoing GI complaints. Reports family history of colon cancer. PMH:  Past Medical History:   Diagnosis Date    Anemia     Fibromyalgia     HLD (hyperlipidemia)     Obesity (BMI 30-39.9)     LA (obstructive sleep apnea)     PCOS (polycystic ovarian syndrome)     Prediabetes     Sleep apnea 2022    uses c-pap       PSH:  Past Surgical History:   Procedure Laterality Date     SECTION         Allergies:  No Known Allergies    Home Medications:  Prior to Admission Medications   Prescriptions Last Dose Informant Patient Reported? Taking?   amoxicillin (AMOXIL) 500 MG capsule 2023  Yes Yes   Sig: Take 1 capsule by mouth 3 times daily   cetirizine (ZYRTEC) 10 MG tablet 2023  Yes Yes   Sig: Take 1 tablet by mouth daily   ibuprofen (ADVIL;MOTRIN) 200 MG tablet Unknown  Yes Yes   Sig: Take 1 tablet by mouth every 6 hours as needed for Pain      Facility-Administered Medications: None       Hospital Medications:  Current Facility-Administered Medications   Medication Dose Route Frequency    0.9 % sodium chloride infusion   IntraVENous Continuous       Social History:  Social History     Tobacco Use    Smoking status: Never    Smokeless tobacco: Never   Substance Use Topics    Alcohol use:  Yes     Alcohol/week: 2.0 standard drinks       Family History:  Family History   Problem Relation Age of Onset    Lung Disease Mother     Breast Cancer Paternal Aunt 79    Lung Disease Father     Cancer Father     Elevated Lipids Father     Hypertension Father     Heart Disease Father     Colon Cancer Father     Breast Cancer Mother

## 2023-05-18 NOTE — ANESTHESIA POSTPROCEDURE EVALUATION
Department of Anesthesiology  Postprocedure Note    Patient: Elder Perea  MRN: [id number]  YOB: 1975  Date of evaluation: 5/18/2023      Procedure Summary     Date: 05/18/23 Room / Location: [facility] ENDO 02 / [facility] ENDOSCOPY    Anesthesia Start: 1052 Anesthesia Stop: 1134    Procedures:       COLONOSCOPY (Lower GI Region)      COLONOSCOPY POLYPECTOMY SNARE/COLD BIOPSY (Lower GI Region) Diagnosis:       Family history of colon cancer      (Family history of colon cancer [Z80.0])    Surgeons: Melania Darby MD Responsible Provider: Melchor Acosta MD    Anesthesia Type: MAC ASA Status: 1          Anesthesia Type: No value filed.     Fitz Phase I: Fitz Score: 10    Fitz Phase II: Fitz Score: 10      Anesthesia Post Evaluation    Patient location during evaluation: bedside  Patient participation: complete - patient participated  Level of consciousness: awake  Airway patency: patent  Nausea & Vomiting: no vomiting and no nausea  Complications: no  Cardiovascular status: hemodynamically stable  Respiratory status: acceptable  Hydration status: stable

## 2023-05-18 NOTE — PROGRESS NOTES
Endoscopy discharge instructions have been reviewed and given to patient. The patient verbalized understanding and acceptance of instructions. Dr. Marcelino Epley discussed with patient procedure findings and next steps.

## 2023-05-18 NOTE — OP NOTE
pathology    Complications: None. EBL:  None. Impression:  Two polyps removed and sent to pathology, otherwise mucosa within normal                        Recommendations:  -If adenoma is present, repeat colonoscopy in 3 years.   -High fiber diet.    -Resume current medication(s)    Discharge Disposition:  Home in the company of a  when able to ambulate.     Royal Laura MD  5/18/2023  11:33 AM

## 2023-05-18 NOTE — PROGRESS NOTES
[place]  1975  [id number]    Situation:  Verbal report received from: Mejia Yang RN   Procedure: Procedure(s):  COLONOSCOPY  COLONOSCOPY POLYPECTOMY SNARE/COLD BIOPSY    Background:    Preoperative diagnosis: Family history of colon cancer [Z80.0]  Postoperative diagnosis: * No post-op diagnosis entered *    :  Dr. Cari Murillo  Assistant(s): Circulator: Cece Chen RN  Endoscopy Technician: Sai Rodriguez    Specimens:   ID Type Source Tests Collected by Time Destination   A : ascending polyp Tissue Colon-Ascending CULTURE, TISSUE Lissette Thompson MD 5/18/2023 1122    B : polyp sigmoid Tissue Sigmoid Colon CULTURE, TISSUE Lissette Thompson MD 5/18/2023 1129      H. Pylori  No    Assessment:    Anesthesia gave intra-procedure sedation and medications, see anesthesia flow sheet No    Intravenous fluids: NS@ KVO     Vital signs stable     Abdominal assessment: round and soft     Recommendation:  Discharge patient per MD order.   Return to floor  Family or Friend   Permission to share finding with family or friend Yes

## 2023-05-19 PROBLEM — D12.6 TUBULAR ADENOMA OF COLON: Status: ACTIVE | Noted: 2023-05-19

## 2023-05-25 RX ORDER — CETIRIZINE HYDROCHLORIDE 10 MG/1
20 CAPSULE, LIQUID FILLED ORAL DAILY
COMMUNITY
Start: 2022-04-12

## 2023-05-25 RX ORDER — FLUTICASONE PROPIONATE 50 MCG
2 SPRAY, SUSPENSION (ML) NASAL DAILY
COMMUNITY

## 2023-05-25 RX ORDER — MONTELUKAST SODIUM 10 MG/1
10 TABLET ORAL DAILY
COMMUNITY
Start: 2022-04-12

## 2023-09-01 ENCOUNTER — TELEPHONE (OUTPATIENT)
Age: 48
End: 2023-09-01

## 2023-09-01 NOTE — TELEPHONE ENCOUNTER
----- Message from Cathie Keegan sent at 8/31/2023  8:17 PM EDT -----  Regarding: Cpap intolerance  Contact: [phone]  I received a call to change my appoinment from 9/29. I will try to call to confirm. However, I have been meaning to get in touch. I have not tolerated the Cpap, I tried for a long time but its just not working. I still snore and drool incessantly and it pools in the mask and its uncomfortable. I live in two different places during the week and this brings logistic issues, its just not working out. Would like to discuss options moving forward.

## 2024-09-03 ENCOUNTER — OFFICE VISIT (OUTPATIENT)
Age: 49
End: 2024-09-03
Payer: OTHER GOVERNMENT

## 2024-09-03 VITALS
RESPIRATION RATE: 18 BRPM | HEART RATE: 86 BPM | HEIGHT: 62 IN | SYSTOLIC BLOOD PRESSURE: 115 MMHG | BODY MASS INDEX: 34.6 KG/M2 | OXYGEN SATURATION: 98 % | WEIGHT: 188 LBS | TEMPERATURE: 98 F | DIASTOLIC BLOOD PRESSURE: 71 MMHG

## 2024-09-03 DIAGNOSIS — E78.00 HYPERCHOLESTEROLEMIA: ICD-10-CM

## 2024-09-03 DIAGNOSIS — R73.03 PREDIABETES: ICD-10-CM

## 2024-09-03 DIAGNOSIS — Z00.00 WELL WOMAN EXAM (NO GYNECOLOGICAL EXAM): Primary | ICD-10-CM

## 2024-09-03 PROCEDURE — 99214 OFFICE O/P EST MOD 30 MIN: CPT

## 2024-09-03 RX ORDER — TIRZEPATIDE 12.5 MG/.5ML
12.5 INJECTION, SOLUTION SUBCUTANEOUS
COMMUNITY

## 2024-09-03 SDOH — ECONOMIC STABILITY: FOOD INSECURITY: WITHIN THE PAST 12 MONTHS, THE FOOD YOU BOUGHT JUST DIDN'T LAST AND YOU DIDN'T HAVE MONEY TO GET MORE.: NEVER TRUE

## 2024-09-03 SDOH — ECONOMIC STABILITY: INCOME INSECURITY: HOW HARD IS IT FOR YOU TO PAY FOR THE VERY BASICS LIKE FOOD, HOUSING, MEDICAL CARE, AND HEATING?: NOT HARD AT ALL

## 2024-09-03 SDOH — ECONOMIC STABILITY: FOOD INSECURITY: WITHIN THE PAST 12 MONTHS, YOU WORRIED THAT YOUR FOOD WOULD RUN OUT BEFORE YOU GOT MONEY TO BUY MORE.: NEVER TRUE

## 2024-09-03 ASSESSMENT — PATIENT HEALTH QUESTIONNAIRE - PHQ9
SUM OF ALL RESPONSES TO PHQ9 QUESTIONS 1 & 2: 0
SUM OF ALL RESPONSES TO PHQ QUESTIONS 1-9: 0
SUM OF ALL RESPONSES TO PHQ QUESTIONS 1-9: 0
2. FEELING DOWN, DEPRESSED OR HOPELESS: NOT AT ALL
SUM OF ALL RESPONSES TO PHQ QUESTIONS 1-9: 0
1. LITTLE INTEREST OR PLEASURE IN DOING THINGS: NOT AT ALL
SUM OF ALL RESPONSES TO PHQ QUESTIONS 1-9: 0

## 2024-09-03 NOTE — PROGRESS NOTES
Room 20    Patient is accompanied by self. I have received verbal consent from Sarah Avery to discuss any/all medical information while they are present in the room.    Identified pt with two pt identifiers(name and ). Reviewed record in preparation for visit and have obtained necessary documentation.  Chief Complaint   Patient presents with    Annual Exam     Wants labs checked , gets Zepbound from another provider     Health Maintenance Due   Topic    Depression Screen     HIV screen     Hepatitis B vaccine (1 of 3 - 19+ 3-dose series)    DTaP/Tdap/Td vaccine (2 - Td or Tdap)    A1C test (Diabetic or Prediabetic)     Breast cancer screen     Flu vaccine (1)    COVID-19 Vaccine ( season)       There were no vitals filed for this visit.    Social Determinants Of Health:       SDOH screening not required at visit.  Resources Declined.   See AVS for attached resources, if requested.    Coordination of Care Questionnaire:       \"Have you been to the ER, urgent care clinic since your last visit?  Hospitalized since your last visit?\"    NO    “Have you seen or consulted any other health care providers outside of [facility] since your last visit?”    NO    Have you had a mammogram?”   NO    Date of last Mammogram: 2022             Click Here for Release of Records Request

## 2024-09-03 NOTE — PROGRESS NOTES
[facility] Medicine Residency    Subjective:   Sarah Avery is an 49 y.o. female who presents for complete physical exam.    Doing well. No complaints.    She has a history of HPV in the past, last pap negative for HPV 2022  Behind on mammogram  Last colonoscopy 2023. Had 3 polyps needs colonoscopy every 3 years.    Diet: Protein heavy and vegetables with simple carb  Exercise: Walking 3 miles a few times per week.  Tobacco use: Never  Alcohol use: social drinker, 3 drinks per week on average    Health Maintenance   Topic Date Due    HIV screen  Never done    Hepatitis B vaccine (1 of 3 - 19+ 3-dose series) Never done    DTaP/Tdap/Td vaccine (2 - Td or Tdap) 06/15/2022    A1C test (Diabetic or Prediabetic)  06/17/2023    Breast cancer screen  09/29/2023    Flu vaccine (1) 08/01/2024    COVID-19 Vaccine (4 - 2023-24 season) 09/01/2024    Depression Screen  09/03/2025    Lipids  06/17/2027    Cervical cancer screen  06/17/2027    Colorectal Cancer Screen  05/18/2033    Hepatitis C screen  Completed    Hepatitis A vaccine  Aged Out    Hib vaccine  Aged Out    Polio vaccine  Aged Out    Meningococcal (ACWY) vaccine  Aged Out    Pneumococcal 0-64 years Vaccine  Aged Out       Immunizations, reviewed:   Immunization History   Administered Date(s) Administered    Influenza Virus Vaccine 10/15/2019    TDaP, ADACEL (age 10y-64y), BOOSTRIX (age 10y+), IM, 0.5mL 06/15/2012     Flu:  Due  Tdap:  UTD, 2022    Health Maintenance, reviewed:  Colonoscopy:  UTD, last 2023 , next one in 3 years  Diabetes:  Due ,   Hemoglobin A1C   Date Value Ref Range Status   06/17/2022 5.8 (H) 4.0 - 5.6 % Final     Comment:     NEW METHOD  PLEASE NOTE NEW REFERENCE RANGE  (NOTE)  HbA1C Interpretive Ranges  <5.7              Normal  5.7 - 6.4         Consider Prediabetes  >6.5              Consider Diabetes       Hepatitis C testing: UTD, nonreactive  HIV testing:  Due  Lipids: Due,   Lab Results

## 2024-09-04 LAB
25(OH)D3 SERPL-MCNC: 16 NG/ML (ref 30–100)
ANION GAP SERPL CALC-SCNC: 3 MMOL/L (ref 5–15)
BASOPHILS # BLD: 0.1 K/UL (ref 0–0.1)
BASOPHILS NFR BLD: 1 % (ref 0–1)
BUN SERPL-MCNC: 10 MG/DL (ref 6–20)
BUN/CREAT SERPL: 14 (ref 12–20)
CALCIUM SERPL-MCNC: 9.2 MG/DL (ref 8.5–10.1)
CHLORIDE SERPL-SCNC: 105 MMOL/L (ref 97–108)
CHOLEST SERPL-MCNC: 252 MG/DL
CO2 SERPL-SCNC: 30 MMOL/L (ref 21–32)
CREAT SERPL-MCNC: 0.7 MG/DL (ref 0.55–1.02)
DIFFERENTIAL METHOD BLD: NORMAL
EOSINOPHIL # BLD: 0.1 K/UL (ref 0–0.4)
EOSINOPHIL NFR BLD: 3 % (ref 0–7)
ERYTHROCYTE [DISTWIDTH] IN BLOOD BY AUTOMATED COUNT: 13.7 % (ref 11.5–14.5)
EST. AVERAGE GLUCOSE BLD GHB EST-MCNC: 108 MG/DL
GLUCOSE SERPL-MCNC: 97 MG/DL (ref 65–100)
HBA1C MFR BLD: 5.4 % (ref 4–5.6)
HCT VFR BLD AUTO: 38 % (ref 35–47)
HDLC SERPL-MCNC: 61 MG/DL
HDLC SERPL: 4.1 (ref 0–5)
HGB BLD-MCNC: 11.8 G/DL (ref 11.5–16)
HIV 1+2 AB+HIV1 P24 AG SERPL QL IA: NONREACTIVE
HIV 1/2 RESULT COMMENT: NORMAL
IMM GRANULOCYTES # BLD AUTO: 0 K/UL (ref 0–0.04)
IMM GRANULOCYTES NFR BLD AUTO: 0 % (ref 0–0.5)
LDLC SERPL CALC-MCNC: 171 MG/DL (ref 0–100)
LYMPHOCYTES # BLD: 1.7 K/UL (ref 0.8–3.5)
LYMPHOCYTES NFR BLD: 32 % (ref 12–49)
MCH RBC QN AUTO: 26.5 PG (ref 26–34)
MCHC RBC AUTO-ENTMCNC: 31.1 G/DL (ref 30–36.5)
MCV RBC AUTO: 85.2 FL (ref 80–99)
MONOCYTES # BLD: 0.3 K/UL (ref 0–1)
MONOCYTES NFR BLD: 7 % (ref 5–13)
NEUTS SEG # BLD: 2.9 K/UL (ref 1.8–8)
NEUTS SEG NFR BLD: 57 % (ref 32–75)
NRBC # BLD: 0 K/UL (ref 0–0.01)
NRBC BLD-RTO: 0 PER 100 WBC
PLATELET # BLD AUTO: 294 K/UL (ref 150–400)
PMV BLD AUTO: 10.7 FL (ref 8.9–12.9)
POTASSIUM SERPL-SCNC: 4.2 MMOL/L (ref 3.5–5.1)
RBC # BLD AUTO: 4.46 M/UL (ref 3.8–5.2)
SODIUM SERPL-SCNC: 138 MMOL/L (ref 136–145)
TRIGL SERPL-MCNC: 100 MG/DL
VLDLC SERPL CALC-MCNC: 20 MG/DL
WBC # BLD AUTO: 5.2 K/UL (ref 3.6–11)

## 2024-09-05 NOTE — PROGRESS NOTES
[facility] Residency Attending Attestation: I have seen and evaluated the patient, repeating/performing the critical or key elements of the service. I discussed the findings, assessment and plan with the resident and agree with the resident's documentation.    Maikel Aquino MD, MPH  [facility]

## 2024-09-05 NOTE — PROGRESS NOTES
[facility] Residency Attending Attestation: I have seen and evaluated the patient, repeating/performing the critical or key elements of the service. I discussed the findings, assessment and plan with the resident and agree with the resident's documentation.    Maikel Aquino MD, MPH  [facility]

## 2024-09-05 NOTE — PROGRESS NOTES
[facility] Residency Attending Attestation: I have seen and evaluated the patient, repeating/performing the critical or key elements of the service. I discussed the findings, assessment and plan with the resident and agree with the resident's documentation.    Miakel Aquino MD, MPH  [facility]

## 2024-10-31 ENCOUNTER — HOSPITAL ENCOUNTER (OUTPATIENT)
Facility: HOSPITAL | Age: 49
Discharge: HOME | End: 2024-11-03
Payer: OTHER GOVERNMENT

## 2024-10-31 DIAGNOSIS — Z00.00 WELL WOMAN EXAM (NO GYNECOLOGICAL EXAM): ICD-10-CM

## 2024-10-31 DIAGNOSIS — Z12.31 ENCOUNTER FOR SCREENING MAMMOGRAM FOR BREAST CANCER: ICD-10-CM

## 2024-10-31 PROCEDURE — 77063 BREAST TOMOSYNTHESIS BI: CPT

## 2024-11-01 DIAGNOSIS — R92.8 ABNORMALITY OF RIGHT BREAST ON SCREENING MAMMOGRAM: Primary | ICD-10-CM

## 2024-11-01 NOTE — PROGRESS NOTES
Orders for R Breast diagnostic mammo and R breast US placed per recs from mammo on 10/31/24. Per note from Ceci Guy: pt made aware of need for additional imaging and pt info sent to [place] for scheduling.    Bulmaro Wolfe MD

## 2024-11-01 NOTE — PROGRESS NOTES
Spoke with patient about need for additional imaging of her right breast. Her info was sent to [place] for scheduling. meli

## 2024-11-27 ENCOUNTER — HOSPITAL ENCOUNTER (OUTPATIENT)
Facility: HOSPITAL | Age: 49
Discharge: HOME | End: 2024-11-30
Payer: OTHER GOVERNMENT

## 2024-11-27 VITALS — WEIGHT: 185 LBS | HEIGHT: 62 IN | BODY MASS INDEX: 34.04 KG/M2

## 2024-11-27 DIAGNOSIS — R92.8 ABNORMALITY OF RIGHT BREAST ON SCREENING MAMMOGRAM: ICD-10-CM

## 2024-11-27 PROCEDURE — 76642 ULTRASOUND BREAST LIMITED: CPT

## 2024-11-27 PROCEDURE — G0279 TOMOSYNTHESIS, MAMMO: HCPCS

## 2025-03-11 NOTE — PROGRESS NOTES
-Chronic issue.  -Continue home aspirin and atorvastatin.   Melina Beasley (1975) is a 41 y.o. female, new patient, here for evaluation of:    Chief complaint(s):   Chief Complaint   Patient presents with    Numbness     left hand    Headache       ASSESSMENT/ PLAN:      ICD-10-CM ICD-9-CM    1. Visual changes  H53.9 368.9 MRI BRAIN W WO CONT   2. Pounding headache  R51.9 784.0 MRI BRAIN W WO CONT      DUPLEX CAROTID BILATERAL   3. Paresthesias in left hand  R20.2 782.0 MRI BRAIN W WO CONT       Onset of severe pounding headaches in August 2020, almost entirely while lying in bed and when changing positions. Hasn't experienced them when not lying in bed. May have had some relation to HIT exercise program at the time. Less frequent headache over time/ Has not been exercising as much recently. Does not recall any head or neck injury preceding the headaches. Separately, noted to have significant reduced vision in right eye at visit with PCP in Fall 2020. Vision has improved but has not seen Ophthalmology yet due to insurance changes. ? Exercise-induced headaches vs cervico-occipital neuralgia (hx of myofascial pain in shoulders/ neck and tension HAs) vs carotid or vertebral artery dissection (no bruits heard, no hx of head/ neck injury) vs optic neuritis (less likely as no report of eye pain when found to have reduced vision on right side). Check MRI Brain +/- contrast  Check Carotid Dopplers  Attempted to order MRA Brain w/o contrast but Insurance/ EMR would not allow me to order both MRI Brain and MRA Brain at this visit. Paresthesia in hand: denies any numbness, tingling, pain down left arm. Normal sensation and strength on exam.  Reports hand feels tight, achy. D/w patient I don't think EMG will be revealing for this issue, I think she should see hand specialist to see if its arthritis/ tendonitis. If EMS is recommended by that provider, I will be happy to perform it. Follow up in 4-5 weeks to go over test results.      Follow-up and Dispositions    · Return in about 4 weeks (around 3/26/2021).       =========================================    SUBJECTIVE/ OBJECTIVE:    HPI: 41 y.o. 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 Pounding, throbbing, lasting for 10 seconds or so when she changed position in that way. She still experiences the headaches 1-2 days a week, same duration, same situations. Doesn't recall them when she's standing up, moving around or sitting in any given place. Denies being sick at the time. Had no reason to get tested for covid. Did get a covid test in Dec 2020 and was (-). Prior to Aug 2020, she would have frequent \"stress headaches\" starting in shoulders and may extend up to / around side of head. She had a physical in 2020 prior to her assignment, and says they found significant significant visual change in right eye (20/ 80). She says she could not appreciate the deficit prior to that. Since then she has checked one eye open/ one eye closed and vision seems normal/ equal in both eye. Has planned to see Ophthalmologist, hasn't there yet due insurance pre-authorization. She also developed a focal area of itching in the left palm which transitioned into a numbness in the same area (ovoid area along base of palm and wrist). No numbness of fingers. Reports handgrip feels weak, achy. Review of Systems: joint pain      No Known Allergies    Current Outpatient Medications   Medication Sig Dispense Refill    Cetirizine (ZYRTEC) 10 mg cap Take  by mouth.  multivitamin (ONE A DAY) tablet Take 1 Tab by mouth daily.          Past Medical History:   Diagnosis Date    Anemia     HLD (hyperlipidemia)     PCOS (polycystic ovarian syndrome)     Prediabetes        Past Surgical History:   Procedure Laterality Date    HX  SECTION family history includes Arthritis-osteo in her mother; Breast Cancer (age of onset: 79) in her paternal aunt; Cancer in her father and mother; Monda Pap in her father; Diabetes in her mother; Elevated Lipids in her father and mother; Heart Disease in her father; Hypertension in her father and mother; Lung Disease in her father and mother. reports that she has never smoked. She has never used smokeless tobacco. She reports current alcohol use of about 2.0 standard drinks of alcohol per week. She reports that she does not use drugs. Physical Exam:    Vitals:    02/26/21 0907   BP: 124/78   BP 1 Location: Left arm   BP Patient Position: Sitting   BP Cuff Size: Adult   Pulse: 89   Temp: 97.5 °F (36.4 °C)   SpO2: 98%   Weight: 94.8 kg (209 lb)         General:  Alert, cooperative, NAD   Head:  Normocephalic, atraumatic. No tenderness of occipital or suboccipital areas  No carotid bruits     Eyes:  Conjunctivae/corneas clear   Lungs:  Heart:  Not examined  Not examined   Extremities: No edema.    Skin: No rashes    Neurologic Exam       Language: normal  Memory:  Alert, oriented to person, place, situation    Cranial Nerves:  I: smell Not tested   II: visual fields Full to confrontation   II: pupils Equal, round, reactive to light   II: optic disc No papilledema   III,VII: ptosis none   III,IV,VI: extraocular muscles  normal   V: facial light touch sensation  normal   VII: facial muscle function  symmetric   VIII: hearing symmetric   IX: soft palate elevation  normal   XI: sternocleidomastoid strength 5/5   XII: tongue  midline      Motor: normal bulk, tone, strength in all exts (including intrinsic hand muscles)  Sensory: intact to LT, PP, temp, vibration x 4 exts   Cerebellar: no rest, postural, or intention tremor  Normal FNF and H-Shin bilaterally  Reflexes: 2+ throughout  Plantar response: neutral bilaterally    Gait: normal gait including tandem  Romberg negative       An electronic signature was used to authenticate this note.   -- Barak Milian MD

## (undated) DEVICE — SYRINGE MED 50ML LUERSLIP TIP

## (undated) DEVICE — CANNULA CAPNOGRAPHY AD O2 TBNG L7FT FEM LUER STYL 4707F25] SALTER LABS INC]

## (undated) DEVICE — PLATE GRND ELECTROSURICAL PT

## (undated) DEVICE — CONTAINER SPEC 20 ML LID NEUT BUFF FORMALIN 10 % POLYPR STS

## (undated) DEVICE — SNARE ENDOSCP POLYP MED STD AD 2.4X27X240 CM 2.8 MM OVL SENS

## (undated) DEVICE — BOSTON SCIENTIFIC

## (undated) DEVICE — CUSA® CLARITY SPECIMEN TRAP (SOCK INSERT): Brand: CUSA® CLARITY

## (undated) DEVICE — FORCEPS BX L240CM JAW DIA2.8MM L CAP W/ NDL MIC MESH TOOTH